# Patient Record
Sex: FEMALE | Race: ASIAN | NOT HISPANIC OR LATINO | Employment: PART TIME | URBAN - METROPOLITAN AREA
[De-identification: names, ages, dates, MRNs, and addresses within clinical notes are randomized per-mention and may not be internally consistent; named-entity substitution may affect disease eponyms.]

---

## 2017-09-12 ENCOUNTER — CONVERSION ENCOUNTER (OUTPATIENT)
Dept: MAMMOGRAPHY | Facility: HOSPITAL | Age: 47
End: 2017-09-12

## 2018-03-01 ENCOUNTER — OFFICE VISIT CONVERTED (OUTPATIENT)
Dept: GASTROENTEROLOGY | Facility: CLINIC | Age: 48
End: 2018-03-01
Attending: PHYSICIAN ASSISTANT

## 2018-04-27 ENCOUNTER — OFFICE VISIT CONVERTED (OUTPATIENT)
Dept: GASTROENTEROLOGY | Facility: CLINIC | Age: 48
End: 2018-04-27
Attending: PHYSICIAN ASSISTANT

## 2018-05-01 ENCOUNTER — CONVERSION ENCOUNTER (OUTPATIENT)
Dept: FAMILY MEDICINE CLINIC | Facility: CLINIC | Age: 48
End: 2018-05-01

## 2018-05-01 ENCOUNTER — OFFICE VISIT CONVERTED (OUTPATIENT)
Dept: FAMILY MEDICINE CLINIC | Facility: CLINIC | Age: 48
End: 2018-05-01
Attending: FAMILY MEDICINE

## 2018-06-05 ENCOUNTER — OFFICE VISIT CONVERTED (OUTPATIENT)
Dept: FAMILY MEDICINE CLINIC | Facility: CLINIC | Age: 48
End: 2018-06-05
Attending: FAMILY MEDICINE

## 2018-09-14 ENCOUNTER — OFFICE VISIT CONVERTED (OUTPATIENT)
Dept: FAMILY MEDICINE CLINIC | Facility: CLINIC | Age: 48
End: 2018-09-14
Attending: FAMILY MEDICINE

## 2018-10-29 ENCOUNTER — OFFICE VISIT CONVERTED (OUTPATIENT)
Dept: ORTHOPEDIC SURGERY | Facility: CLINIC | Age: 48
End: 2018-10-29
Attending: ORTHOPAEDIC SURGERY

## 2018-11-07 ENCOUNTER — OFFICE VISIT CONVERTED (OUTPATIENT)
Dept: ORTHOPEDIC SURGERY | Facility: CLINIC | Age: 48
End: 2018-11-07
Attending: ORTHOPAEDIC SURGERY

## 2018-11-07 ENCOUNTER — CONVERSION ENCOUNTER (OUTPATIENT)
Dept: ORTHOPEDIC SURGERY | Facility: CLINIC | Age: 48
End: 2018-11-07

## 2018-11-09 ENCOUNTER — CONVERSION ENCOUNTER (OUTPATIENT)
Dept: MAMMOGRAPHY | Facility: HOSPITAL | Age: 48
End: 2018-11-09

## 2019-04-02 ENCOUNTER — HOSPITAL ENCOUNTER (OUTPATIENT)
Dept: URGENT CARE | Facility: CLINIC | Age: 49
Discharge: HOME | End: 2019-04-02
Attending: PHYSICIAN ASSISTANT

## 2019-04-10 ENCOUNTER — HOSPITAL ENCOUNTER (OUTPATIENT)
Dept: URGENT CARE | Facility: CLINIC | Age: 49
Discharge: HOME | End: 2019-04-10
Attending: EMERGENCY MEDICINE

## 2019-04-15 ENCOUNTER — HOSPITAL ENCOUNTER (OUTPATIENT)
Dept: OTHER | Facility: HOSPITAL | Age: 49
Discharge: HOME | End: 2019-04-15
Attending: FAMILY MEDICINE

## 2019-04-15 LAB
25(OH)D3 SERPL-MCNC: 30.6 NG/ML (ref 30–100)
ALBUMIN SERPL-MCNC: 4.4 G/DL (ref 3.5–5)
ALBUMIN/GLOB SERPL: 1.5 {RATIO} (ref 1.4–2.6)
ALP SERPL-CCNC: 63 U/L (ref 42–98)
ALT SERPL-CCNC: 19 U/L (ref 10–40)
ANION GAP SERPL CALC-SCNC: 15 MMOL/L (ref 8–19)
AST SERPL-CCNC: 17 U/L (ref 15–50)
BASOPHILS # BLD AUTO: 0.02 10*3/UL (ref 0–0.2)
BASOPHILS NFR BLD AUTO: 0.5 % (ref 0–3)
BILIRUB SERPL-MCNC: 0.46 MG/DL (ref 0.2–1.3)
BUN SERPL-MCNC: 17 MG/DL (ref 5–25)
BUN/CREAT SERPL: 25 {RATIO} (ref 6–20)
CALCIUM SERPL-MCNC: 9.4 MG/DL (ref 8.7–10.4)
CHLORIDE SERPL-SCNC: 99 MMOL/L (ref 99–111)
CHOLEST SERPL-MCNC: 146 MG/DL (ref 107–200)
CHOLEST/HDLC SERPL: 3.7 {RATIO} (ref 3–6)
CONV ABS IMM GRAN: 0.02 10*3/UL (ref 0–0.2)
CONV CO2: 28 MMOL/L (ref 22–32)
CONV IMMATURE GRAN: 0.5 % (ref 0–1.8)
CONV TOTAL PROTEIN: 7.3 G/DL (ref 6.3–8.2)
CREAT UR-MCNC: 0.68 MG/DL (ref 0.5–0.9)
DEPRECATED RDW RBC AUTO: 39.1 FL (ref 36.4–46.3)
EOSINOPHIL # BLD AUTO: 0.11 10*3/UL (ref 0–0.7)
EOSINOPHIL # BLD AUTO: 2.5 % (ref 0–7)
ERYTHROCYTE [DISTWIDTH] IN BLOOD BY AUTOMATED COUNT: 11.4 % (ref 11.7–14.4)
FOLATE SERPL-MCNC: 10 NG/ML (ref 4.8–20)
GFR SERPLBLD BASED ON 1.73 SQ M-ARVRAT: >60 ML/MIN/{1.73_M2}
GLOBULIN UR ELPH-MCNC: 2.9 G/DL (ref 2–3.5)
GLUCOSE SERPL-MCNC: 84 MG/DL (ref 65–99)
HBA1C MFR BLD: 13.8 G/DL (ref 12–16)
HCT VFR BLD AUTO: 40.7 % (ref 37–47)
HDLC SERPL-MCNC: 39 MG/DL (ref 40–60)
IRON SATN MFR SERPL: 44 % (ref 20–55)
IRON SERPL-MCNC: 133 UG/DL (ref 60–170)
LDLC SERPL CALC-MCNC: 89 MG/DL (ref 70–100)
LYMPHOCYTES # BLD AUTO: 1.45 10*3/UL (ref 1–5)
MCH RBC QN AUTO: 31.7 PG (ref 27–31)
MCHC RBC AUTO-ENTMCNC: 33.9 G/DL (ref 33–37)
MCV RBC AUTO: 93.6 FL (ref 81–99)
MONOCYTES # BLD AUTO: 0.25 10*3/UL (ref 0.2–1.2)
MONOCYTES NFR BLD AUTO: 5.8 % (ref 3–10)
MONONUCLEOSIS TEST, QUAL: NEGATIVE
NEUTROPHILS # BLD AUTO: 2.47 10*3/UL (ref 2–8)
NEUTROPHILS NFR BLD AUTO: 57.1 % (ref 30–85)
NRBC CBCN: 0 % (ref 0–0.7)
OSMOLALITY SERPL CALC.SUM OF ELEC: 287 MOSM/KG (ref 273–304)
PLATELET # BLD AUTO: 256 10*3/UL (ref 130–400)
PMV BLD AUTO: 10.1 FL (ref 9.4–12.3)
POTASSIUM SERPL-SCNC: 3.9 MMOL/L (ref 3.5–5.3)
RBC # BLD AUTO: 4.35 10*6/UL (ref 4.2–5.4)
SODIUM SERPL-SCNC: 138 MMOL/L (ref 135–147)
T4 FREE SERPL-MCNC: 1.4 NG/DL (ref 0.9–1.8)
TIBC SERPL-MCNC: 302 UG/DL (ref 245–450)
TRANSFERRIN SERPL-MCNC: 211 MG/DL (ref 250–380)
TRIGL SERPL-MCNC: 89 MG/DL (ref 40–150)
TSH SERPL-ACNC: 1.08 M[IU]/L (ref 0.27–4.2)
VARIANT LYMPHS NFR BLD MANUAL: 33.6 % (ref 20–45)
VIT B12 SERPL-MCNC: 456 PG/ML (ref 211–911)
VLDLC SERPL-MCNC: 18 MG/DL (ref 5–37)
WBC # BLD AUTO: 4.32 10*3/UL (ref 4.8–10.8)

## 2019-04-16 ENCOUNTER — OFFICE VISIT CONVERTED (OUTPATIENT)
Dept: FAMILY MEDICINE CLINIC | Facility: CLINIC | Age: 49
End: 2019-04-16
Attending: FAMILY MEDICINE

## 2019-06-17 ENCOUNTER — OFFICE VISIT CONVERTED (OUTPATIENT)
Dept: PODIATRY | Facility: CLINIC | Age: 49
End: 2019-06-17
Attending: PODIATRIST

## 2019-07-22 ENCOUNTER — OFFICE VISIT CONVERTED (OUTPATIENT)
Dept: PODIATRY | Facility: CLINIC | Age: 49
End: 2019-07-22
Attending: PODIATRIST

## 2019-07-22 ENCOUNTER — CONVERSION ENCOUNTER (OUTPATIENT)
Dept: PODIATRY | Facility: CLINIC | Age: 49
End: 2019-07-22

## 2019-10-15 ENCOUNTER — HOSPITAL ENCOUNTER (OUTPATIENT)
Dept: OTHER | Facility: HOSPITAL | Age: 49
Discharge: HOME | End: 2019-10-15
Attending: FAMILY MEDICINE

## 2019-10-15 LAB
25(OH)D3 SERPL-MCNC: 29 NG/ML (ref 30–100)
ALBUMIN SERPL-MCNC: 4.5 G/DL (ref 3.5–5)
ALBUMIN/GLOB SERPL: 1.7 {RATIO} (ref 1.4–2.6)
ALP SERPL-CCNC: 71 U/L (ref 53–141)
ALT SERPL-CCNC: 14 U/L (ref 10–40)
ANION GAP SERPL CALC-SCNC: 18 MMOL/L (ref 8–19)
AST SERPL-CCNC: 18 U/L (ref 15–50)
BASOPHILS # BLD AUTO: 0.02 10*3/UL (ref 0–0.2)
BASOPHILS NFR BLD AUTO: 0.4 % (ref 0–3)
BILIRUB SERPL-MCNC: 0.41 MG/DL (ref 0.2–1.3)
BUN SERPL-MCNC: 24 MG/DL (ref 5–25)
BUN/CREAT SERPL: 26 {RATIO} (ref 6–20)
CALCIUM SERPL-MCNC: 9.4 MG/DL (ref 8.7–10.4)
CHLORIDE SERPL-SCNC: 102 MMOL/L (ref 99–111)
CHOLEST SERPL-MCNC: 172 MG/DL (ref 107–200)
CHOLEST/HDLC SERPL: 3.6 {RATIO} (ref 3–6)
CONV ABS IMM GRAN: 0.01 10*3/UL (ref 0–0.2)
CONV CO2: 25 MMOL/L (ref 22–32)
CONV IMMATURE GRAN: 0.2 % (ref 0–1.8)
CONV TOTAL PROTEIN: 7.1 G/DL (ref 6.3–8.2)
CREAT UR-MCNC: 0.93 MG/DL (ref 0.5–0.9)
DEPRECATED RDW RBC AUTO: 41.4 FL (ref 36.4–46.3)
EOSINOPHIL # BLD AUTO: 0.17 10*3/UL (ref 0–0.7)
EOSINOPHIL # BLD AUTO: 3.5 % (ref 0–7)
ERYTHROCYTE [DISTWIDTH] IN BLOOD BY AUTOMATED COUNT: 11.9 % (ref 11.7–14.4)
GFR SERPLBLD BASED ON 1.73 SQ M-ARVRAT: >60 ML/MIN/{1.73_M2}
GLOBULIN UR ELPH-MCNC: 2.6 G/DL (ref 2–3.5)
GLUCOSE SERPL-MCNC: 104 MG/DL (ref 65–99)
HCT VFR BLD AUTO: 42.1 % (ref 37–47)
HDLC SERPL-MCNC: 48 MG/DL (ref 40–60)
HGB BLD-MCNC: 14.2 G/DL (ref 12–16)
IRON SATN MFR SERPL: 20 % (ref 20–55)
IRON SERPL-MCNC: 62 UG/DL (ref 60–170)
LDLC SERPL CALC-MCNC: 111 MG/DL (ref 70–100)
LYMPHOCYTES # BLD AUTO: 1.43 10*3/UL (ref 1–5)
LYMPHOCYTES NFR BLD AUTO: 29.5 % (ref 20–45)
MCH RBC QN AUTO: 31.6 PG (ref 27–31)
MCHC RBC AUTO-ENTMCNC: 33.7 G/DL (ref 33–37)
MCV RBC AUTO: 93.8 FL (ref 81–99)
MONOCYTES # BLD AUTO: 0.43 10*3/UL (ref 0.2–1.2)
MONOCYTES NFR BLD AUTO: 8.9 % (ref 3–10)
NEUTROPHILS # BLD AUTO: 2.79 10*3/UL (ref 2–8)
NEUTROPHILS NFR BLD AUTO: 57.5 % (ref 30–85)
NRBC CBCN: 0 % (ref 0–0.7)
OSMOLALITY SERPL CALC.SUM OF ELEC: 296 MOSM/KG (ref 273–304)
PLATELET # BLD AUTO: 192 10*3/UL (ref 130–400)
PMV BLD AUTO: 10.1 FL (ref 9.4–12.3)
POTASSIUM SERPL-SCNC: 3.8 MMOL/L (ref 3.5–5.3)
RBC # BLD AUTO: 4.49 10*6/UL (ref 4.2–5.4)
SODIUM SERPL-SCNC: 141 MMOL/L (ref 135–147)
T4 FREE SERPL-MCNC: 1.2 NG/DL (ref 0.9–1.8)
TIBC SERPL-MCNC: 305 UG/DL (ref 245–450)
TRANSFERRIN SERPL-MCNC: 213 MG/DL (ref 250–380)
TRIGL SERPL-MCNC: 67 MG/DL (ref 40–150)
TSH SERPL-ACNC: 1.97 M[IU]/L (ref 0.27–4.2)
VLDLC SERPL-MCNC: 13 MG/DL (ref 5–37)
WBC # BLD AUTO: 4.85 10*3/UL (ref 4.8–10.8)

## 2019-10-17 ENCOUNTER — CONVERSION ENCOUNTER (OUTPATIENT)
Dept: FAMILY MEDICINE CLINIC | Facility: CLINIC | Age: 49
End: 2019-10-17

## 2019-10-17 ENCOUNTER — OFFICE VISIT CONVERTED (OUTPATIENT)
Dept: FAMILY MEDICINE CLINIC | Facility: CLINIC | Age: 49
End: 2019-10-17
Attending: FAMILY MEDICINE

## 2019-11-12 ENCOUNTER — HOSPITAL ENCOUNTER (OUTPATIENT)
Dept: MAMMOGRAPHY | Facility: HOSPITAL | Age: 49
Discharge: HOME | End: 2019-11-12
Attending: FAMILY MEDICINE

## 2020-01-24 ENCOUNTER — HOSPITAL ENCOUNTER (OUTPATIENT)
Dept: URGENT CARE | Facility: CLINIC | Age: 50
Discharge: HOME | End: 2020-01-24
Attending: NURSE PRACTITIONER

## 2020-01-26 LAB — BACTERIA SPEC AEROBE CULT: NORMAL

## 2020-02-25 ENCOUNTER — HOSPITAL ENCOUNTER (OUTPATIENT)
Dept: URGENT CARE | Facility: CLINIC | Age: 50
Discharge: HOME | End: 2020-02-25
Attending: EMERGENCY MEDICINE

## 2020-02-27 LAB — BACTERIA SPEC AEROBE CULT: NORMAL

## 2020-04-21 ENCOUNTER — TELEPHONE CONVERTED (OUTPATIENT)
Dept: FAMILY MEDICINE CLINIC | Facility: CLINIC | Age: 50
End: 2020-04-21
Attending: FAMILY MEDICINE

## 2020-07-15 ENCOUNTER — HOSPITAL ENCOUNTER (OUTPATIENT)
Dept: OTHER | Facility: HOSPITAL | Age: 50
Discharge: HOME | End: 2020-07-15
Attending: FAMILY MEDICINE

## 2020-07-15 LAB
25(OH)D3 SERPL-MCNC: 32.5 NG/ML (ref 30–100)
ALBUMIN SERPL-MCNC: 4.5 G/DL (ref 3.5–5)
ALBUMIN/GLOB SERPL: 1.7 {RATIO} (ref 1.4–2.6)
ALP SERPL-CCNC: 78 U/L (ref 53–141)
ALT SERPL-CCNC: 19 U/L (ref 10–40)
ANION GAP SERPL CALC-SCNC: 14 MMOL/L (ref 8–19)
AST SERPL-CCNC: 26 U/L (ref 15–50)
BASOPHILS # BLD AUTO: 0.03 10*3/UL (ref 0–0.2)
BASOPHILS NFR BLD AUTO: 0.6 % (ref 0–3)
BILIRUB SERPL-MCNC: 0.62 MG/DL (ref 0.2–1.3)
BUN SERPL-MCNC: 13 MG/DL (ref 5–25)
BUN/CREAT SERPL: 16 {RATIO} (ref 6–20)
CALCIUM SERPL-MCNC: 9.6 MG/DL (ref 8.7–10.4)
CHLORIDE SERPL-SCNC: 104 MMOL/L (ref 99–111)
CHOLEST SERPL-MCNC: 172 MG/DL (ref 107–200)
CHOLEST/HDLC SERPL: 3.4 {RATIO} (ref 3–6)
CONV ABS IMM GRAN: 0 10*3/UL (ref 0–0.2)
CONV CO2: 25 MMOL/L (ref 22–32)
CONV IMMATURE GRAN: 0 % (ref 0–1.8)
CONV TOTAL PROTEIN: 7.1 G/DL (ref 6.3–8.2)
CREAT UR-MCNC: 0.8 MG/DL (ref 0.5–0.9)
DEPRECATED RDW RBC AUTO: 42.1 FL (ref 36.4–46.3)
EOSINOPHIL # BLD AUTO: 0.09 10*3/UL (ref 0–0.7)
EOSINOPHIL # BLD AUTO: 1.9 % (ref 0–7)
ERYTHROCYTE [DISTWIDTH] IN BLOOD BY AUTOMATED COUNT: 12 % (ref 11.7–14.4)
FOLATE SERPL-MCNC: 16.7 NG/ML (ref 4.8–20)
GFR SERPLBLD BASED ON 1.73 SQ M-ARVRAT: >60 ML/MIN/{1.73_M2}
GLOBULIN UR ELPH-MCNC: 2.6 G/DL (ref 2–3.5)
GLUCOSE SERPL-MCNC: 96 MG/DL (ref 65–99)
HCT VFR BLD AUTO: 43.3 % (ref 37–47)
HDLC SERPL-MCNC: 50 MG/DL (ref 40–60)
HGB BLD-MCNC: 14.3 G/DL (ref 12–16)
IRON SATN MFR SERPL: 34 % (ref 20–55)
IRON SERPL-MCNC: 110 UG/DL (ref 60–170)
LDLC SERPL CALC-MCNC: 100 MG/DL (ref 70–100)
LYMPHOCYTES # BLD AUTO: 1.51 10*3/UL (ref 1–5)
LYMPHOCYTES NFR BLD AUTO: 32 % (ref 20–45)
MCH RBC QN AUTO: 31.4 PG (ref 27–31)
MCHC RBC AUTO-ENTMCNC: 33 G/DL (ref 33–37)
MCV RBC AUTO: 95.2 FL (ref 81–99)
MONOCYTES # BLD AUTO: 0.41 10*3/UL (ref 0.2–1.2)
MONOCYTES NFR BLD AUTO: 8.7 % (ref 3–10)
NEUTROPHILS # BLD AUTO: 2.68 10*3/UL (ref 2–8)
NEUTROPHILS NFR BLD AUTO: 56.8 % (ref 30–85)
NRBC CBCN: 0 % (ref 0–0.7)
OSMOLALITY SERPL CALC.SUM OF ELEC: 288 MOSM/KG (ref 273–304)
PLATELET # BLD AUTO: 236 10*3/UL (ref 130–400)
PMV BLD AUTO: 10.8 FL (ref 9.4–12.3)
POTASSIUM SERPL-SCNC: 4.3 MMOL/L (ref 3.5–5.3)
RBC # BLD AUTO: 4.55 10*6/UL (ref 4.2–5.4)
SODIUM SERPL-SCNC: 139 MMOL/L (ref 135–147)
T4 FREE SERPL-MCNC: 1.3 NG/DL (ref 0.9–1.8)
TIBC SERPL-MCNC: 325 UG/DL (ref 245–450)
TRANSFERRIN SERPL-MCNC: 227 MG/DL (ref 250–380)
TRIGL SERPL-MCNC: 108 MG/DL (ref 40–150)
TSH SERPL-ACNC: 0.96 M[IU]/L (ref 0.27–4.2)
VIT B12 SERPL-MCNC: 383 PG/ML (ref 211–911)
VLDLC SERPL-MCNC: 22 MG/DL (ref 5–37)
WBC # BLD AUTO: 4.72 10*3/UL (ref 4.8–10.8)

## 2020-07-23 ENCOUNTER — OFFICE VISIT CONVERTED (OUTPATIENT)
Dept: FAMILY MEDICINE CLINIC | Facility: CLINIC | Age: 50
End: 2020-07-23
Attending: FAMILY MEDICINE

## 2020-08-18 ENCOUNTER — HOSPITAL ENCOUNTER (OUTPATIENT)
Dept: PERIOP | Facility: HOSPITAL | Age: 50
Setting detail: HOSPITAL OUTPATIENT SURGERY
Discharge: HOME | End: 2020-08-18
Attending: INTERNAL MEDICINE

## 2020-08-18 LAB — SARS-COV-2 RNA SPEC QL NAA+PROBE: NOT DETECTED

## 2020-10-19 ENCOUNTER — OFFICE VISIT CONVERTED (OUTPATIENT)
Dept: ORTHOPEDIC SURGERY | Facility: CLINIC | Age: 50
End: 2020-10-19
Attending: ORTHOPAEDIC SURGERY

## 2020-11-18 ENCOUNTER — HOSPITAL ENCOUNTER (OUTPATIENT)
Dept: URGENT CARE | Facility: CLINIC | Age: 50
Discharge: HOME | End: 2020-11-18
Attending: NURSE PRACTITIONER

## 2020-11-21 LAB — SARS-COV-2 RNA SPEC QL NAA+PROBE: NOT DETECTED

## 2021-03-08 ENCOUNTER — HOSPITAL ENCOUNTER (OUTPATIENT)
Dept: MAMMOGRAPHY | Facility: HOSPITAL | Age: 51
Discharge: HOME | End: 2021-03-08
Attending: FAMILY MEDICINE

## 2021-03-30 ENCOUNTER — HOSPITAL ENCOUNTER (OUTPATIENT)
Dept: OTHER | Facility: HOSPITAL | Age: 51
Discharge: HOME | End: 2021-03-30
Attending: FAMILY MEDICINE

## 2021-03-30 LAB
25(OH)D3 SERPL-MCNC: 29.3 NG/ML (ref 30–100)
ALBUMIN SERPL-MCNC: 4.3 G/DL (ref 3.5–5)
ALBUMIN/GLOB SERPL: 1.5 {RATIO} (ref 1.4–2.6)
ALP SERPL-CCNC: 69 U/L (ref 53–141)
ALT SERPL-CCNC: 13 U/L (ref 10–40)
ANION GAP SERPL CALC-SCNC: 11 MMOL/L (ref 8–19)
AST SERPL-CCNC: 17 U/L (ref 15–50)
BASOPHILS # BLD AUTO: 0.02 10*3/UL (ref 0–0.2)
BASOPHILS NFR BLD AUTO: 0.4 % (ref 0–3)
BILIRUB SERPL-MCNC: 0.32 MG/DL (ref 0.2–1.3)
BUN SERPL-MCNC: 24 MG/DL (ref 5–25)
BUN/CREAT SERPL: 29 {RATIO} (ref 6–20)
CALCIUM SERPL-MCNC: 9.5 MG/DL (ref 8.7–10.4)
CHLORIDE SERPL-SCNC: 105 MMOL/L (ref 99–111)
CHOLEST SERPL-MCNC: 170 MG/DL (ref 107–200)
CHOLEST/HDLC SERPL: 3.5 {RATIO} (ref 3–6)
CONV ABS IMM GRAN: 0.01 10*3/UL (ref 0–0.2)
CONV CO2: 27 MMOL/L (ref 22–32)
CONV IMMATURE GRAN: 0.2 % (ref 0–1.8)
CONV TOTAL PROTEIN: 7.2 G/DL (ref 6.3–8.2)
CREAT UR-MCNC: 0.83 MG/DL (ref 0.5–0.9)
DEPRECATED RDW RBC AUTO: 40.8 FL (ref 36.4–46.3)
EOSINOPHIL # BLD AUTO: 0.07 10*3/UL (ref 0–0.7)
EOSINOPHIL # BLD AUTO: 1.5 % (ref 0–7)
ERYTHROCYTE [DISTWIDTH] IN BLOOD BY AUTOMATED COUNT: 11.8 % (ref 11.7–14.4)
FOLATE SERPL-MCNC: 11.3 NG/ML (ref 4.8–20)
GFR SERPLBLD BASED ON 1.73 SQ M-ARVRAT: >60 ML/MIN/{1.73_M2}
GLOBULIN UR ELPH-MCNC: 2.9 G/DL (ref 2–3.5)
GLUCOSE SERPL-MCNC: 106 MG/DL (ref 65–99)
HCT VFR BLD AUTO: 41.5 % (ref 37–47)
HDLC SERPL-MCNC: 49 MG/DL (ref 40–60)
HGB BLD-MCNC: 14 G/DL (ref 12–16)
IRON SATN MFR SERPL: 20 % (ref 20–55)
IRON SERPL-MCNC: 66 UG/DL (ref 60–170)
LDLC SERPL CALC-MCNC: 108 MG/DL (ref 70–100)
LYMPHOCYTES # BLD AUTO: 1.22 10*3/UL (ref 1–5)
LYMPHOCYTES NFR BLD AUTO: 25.3 % (ref 20–45)
MCH RBC QN AUTO: 32 PG (ref 27–31)
MCHC RBC AUTO-ENTMCNC: 33.7 G/DL (ref 33–37)
MCV RBC AUTO: 94.7 FL (ref 81–99)
MONOCYTES # BLD AUTO: 0.37 10*3/UL (ref 0.2–1.2)
MONOCYTES NFR BLD AUTO: 7.7 % (ref 3–10)
NEUTROPHILS # BLD AUTO: 3.13 10*3/UL (ref 2–8)
NEUTROPHILS NFR BLD AUTO: 64.9 % (ref 30–85)
NRBC CBCN: 0 % (ref 0–0.7)
OSMOLALITY SERPL CALC.SUM OF ELEC: 292 MOSM/KG (ref 273–304)
PLATELET # BLD AUTO: 217 10*3/UL (ref 130–400)
PMV BLD AUTO: 10.7 FL (ref 9.4–12.3)
POTASSIUM SERPL-SCNC: 4.2 MMOL/L (ref 3.5–5.3)
RBC # BLD AUTO: 4.38 10*6/UL (ref 4.2–5.4)
SODIUM SERPL-SCNC: 139 MMOL/L (ref 135–147)
T4 FREE SERPL-MCNC: 1.2 NG/DL (ref 0.9–1.8)
TIBC SERPL-MCNC: 337 UG/DL (ref 245–450)
TRANSFERRIN SERPL-MCNC: 236 MG/DL (ref 250–380)
TRIGL SERPL-MCNC: 67 MG/DL (ref 40–150)
TSH SERPL-ACNC: 1.72 M[IU]/L (ref 0.27–4.2)
VIT B12 SERPL-MCNC: 490 PG/ML (ref 211–911)
VLDLC SERPL-MCNC: 13 MG/DL (ref 5–37)
WBC # BLD AUTO: 4.82 10*3/UL (ref 4.8–10.8)

## 2021-04-05 ENCOUNTER — CONVERSION ENCOUNTER (OUTPATIENT)
Dept: FAMILY MEDICINE CLINIC | Facility: CLINIC | Age: 51
End: 2021-04-05

## 2021-04-05 ENCOUNTER — OFFICE VISIT CONVERTED (OUTPATIENT)
Dept: FAMILY MEDICINE CLINIC | Facility: CLINIC | Age: 51
End: 2021-04-05
Attending: FAMILY MEDICINE

## 2021-05-10 NOTE — H&P
History and Physical      Patient Name: Kalpana Marquez   Patient ID: [id number]   Sex: Female   YOB: 1970    Primary Care Provider: Go Lucio MD   Referring Provider: Go Lucio MD    Visit Date: October 19, 2020    Provider: Hubert Weber MD   Location: [facility]   Location Address: [address] [place] [address]  [id number]   Location Phone: [phone]          Chief Complaint  · Bilateral Knee Pain      History Of Present Illness  Kalpana Marquez is a 50 year old /White female who presents today to [facility].      Patient presents today with a chief complaint of bilateral knee pain. Patient states she has a stationary bicycle for fitness and states she was only on it for 2-3 minutes. Patient states that the following day bilateral knees where in a lot of pain. Patient states bilateral knee pain started about a month ago. Patient states before that her knees have been doing fine. Patient states her right knee is worse than her left. Patient states she can walk 5-6 miles and her knees don't bother her. Patient states that injections she has received in the past have helped her significantly.       Past Medical History  Allergic rhinitis; Arthritis; Depression; Foot pain; Heel pain; Hypertension; Numbness in feet; Seasonal allergies         Past Surgical History  Cesarian Section; Colonoscopy; EGD; Hysterectomy         Medication List  amlodipine 10 mg oral tablet; Claritin 10 mg oral tablet; cyanocobalamin (vitamin B-12) 1,000 mcg/mL injection solution; cyclobenzaprine 5 mg oral tablet; diclofenac sodium 75 mg oral tablet,delayed release (DR/EC); ergocalciferol (vitamin D2) 1,250 mcg (50,000 unit) oral capsule; ketotifen fumarate 0.025 % (0.035 %) ophthalmic (eye) drops; meclizine 12.5 mg oral tablet; meloxicam 7.5 mg oral tablet; mometasone 50 mcg/actuation nasal spray,non-aerosol; Singulair 10 mg oral tablet         Allergy List  NO KNOWN DRUG ALLERGIES; Latex  "Exam Gloves       Allergies Reconciled  Family Medical History  Stroke; Cancer, Unspecified; Diabetes, unspecified type; - No Family History of Colorectal Cancer; Family history of certain chronic disabling diseases; arthritis         Reproductive History   0 Para 0 0 0 0       Social History  Alcohol (Never); Alcohol Use (Never); .; lives with children; Recreational Drug Use (Current some day); Single.; Tobacco (Former); Working         Immunizations  Name Date Admin   Hepatitis A 2019   Hepatitis A 2018   Influenza Refused   Meningococcal (MNG) 2014   Meningococcal (MNG) 1979         Review of Systems  · Constitutional  o Denies  o : fever, chills, weight loss  · Cardiovascular  o Denies  o : chest pain, shortness of breath  · Gastrointestinal  o Denies  o : liver disease, heartburn, nausea, blood in stools  · Genitourinary  o Denies  o : painful urination, blood in urine  · Integument  o Denies  o : rash, itching  · Neurologic  o Denies  o : headache, weakness, loss of consciousness  · Musculoskeletal  o Denies  o : painful, swollen joints  · Psychiatric  o Denies  o : drug/alcohol addiction, anxiety, depression      Vitals  Date Time BP Position Site L\R Cuff Size HR RR TEMP (F) WT  HT  BMI kg/m2 BSA m2 O2 Sat FR L/min FiO2 HC       10/19/2020 09:31 AM      72 - R   173lbs 0oz 5'  1\" 32.69 1.84 98 %            Physical Examination  · Constitutional  o Appearance  o : well developed, well-nourished, no obvious deformities present  · Head and Face  o Head  o :   § Inspection  § : normocephalic  o Face  o :   § Inspection  § : no facial lesions  · Eyes  o Conjunctivae  o : conjunctivae normal  o Sclerae  o : sclerae white  · Ears, Nose, Mouth and Throat  o Ears  o :   § External Ears  § : appearance within normal limits  § Hearing  § : intact  o Nose  o :   § External Nose  § : appearance normal  · Neck  o Inspection/Palpation  o : normal appearance  o Range of Motion  o : " full range of motion  · Respiratory  o Respiratory Effort  o : breathing unlabored  o Inspection of Chest  o : normal appearance  o Auscultation of Lungs  o : no audible wheezing or rales  · Cardiovascular  o Heart  o : regular rate  · Gastrointestinal  o Abdominal Examination  o : soft and non-tender  · Skin and Subcutaneous Tissue  o General Inspection  o : intact, no rashes  · Psychiatric  o General  o : Alert and oriented x3  o Judgement and Insight  o : judgment and insight intact  o Mood and Affect  o : mood normal, affect appropriate  · Extremities  o Extremities  o : BILATERAL KNEES: Sensation grossly intact. Neurovascular intact. Pulses normal. Tender VMO tendon. No swelling or atrophy. Stable gait. Full weight-bearing. Stable to valgus/varus stress. Good strength in quadriceps, hamstrings, dorsiflexors, and plantar flexors. Full flexion and extension. Calf supple, non-tender. Negative Lachman. Negative anterior drawer. Negative posterior sag. No pain with Apley's. No pain with Shauna's.   · In Office Procedures  o View  o : LAT/SUNRISE/STANDING   o Site  o : bilateral, knee  o Indication  o : Bilateral knee pain  o Study  o : X-rays ordered, taken in the office, and reviewed today.  o Xray  o : Good space between bones of bilateral knees. Negative signs of bone spurs. Negative signs of fracture or dislocation.           Assessment  · Pain in both knees, unspecified chronicity       Pain in right knee     719.46/M25.561  Pain in left knee     719.46/M25.562      Plan  · Orders  o Knee (Left) [facility] Preferred View ([id number]) - 719.46/M25.562 - 10/19/2020  o Knee (Right) [facility] Preferred View ([id number]) - 719.46/M25.561 - 10/19/2020  · Medications  o Medications have been Reconciled  o Transition of Care or Provider Policy  · Instructions  o Dr. Weber saw and examined the patient and agrees with plan.   o X-rays reviewed by Dr. Weber.  o Reviewed the patient's Past Medical, Social, and Family history as well as the  ROS at today's visit, no changes.  o Call or return if worsening symptoms.  o Follow Up PRN.  o This note was transcribed by Kezia Ward. melissa  o Discussed diagnosis and treatment options with the patient. Discussed seeing a podiatrist for further evaluation of foot. Patient will follow up if she wants to receive injection in her bilateral knees.             Electronically Signed by: Kezia Ward-, Other -Author on October 19, 2020 01:34:53 PM  Electronically Co-signed by: Hubert Weber MD -Reviewer on October 21, 2020 04:51:32 PM

## 2021-05-12 NOTE — PROGRESS NOTES
Quick Note      Patient Name: Kalpana Marquez   Patient ID: [id number]   Sex: Female   YOB: 1970    Primary Care Provider: Go Lucio MD   Referring Provider: Go Lucio MD    Visit Date: April 21, 2020    Provider: Go Lucio MD   Location: [facility]   Location Address: [address] [place] [address], [address] [place] [address]  [id number]   Location Phone: [phone]          History Of Present Illness  TELEHEALTH TELEPHONE VISIT  Chief Complaint: f/u, refills   Kalpana Marquez is a 49 year old /White female who is presenting for evaluation via telehealth telephone visit. Verbal consent obtained before beginning visit.   Provider spent 12 min minutes with the patient during telehealth visit.   The following staff were present during this visit: Dr. Lucio, telehealth via telephone conversation   Past Medical History/Overview of Patient Symptoms     Pt for f/u.  hx of vertigo..needs a refill on meclizine.    Hx of HTN. needs a refill on amlodipine.       Vitals     telehealth       Physical Examination     telehealth           Assessment  · Vertigo     780.4/R42  · HTN (hypertension)     401.9/I10  · Elbow pain, right     719.42/M25.521       f/ u in 3 months for CPX.      of note.she also c/o right elbow pain..will do an xray of right elbow.       Plan  · Orders  o Physician Telephone Evaluation, 11-20 minutes (89197) - - 04/21/2020  o ACO-14: Influenza immunization administered or previously received () - - 04/21/2020  o ACO-39: Current medications updated and reviewed () - - 04/21/2020  o Elbow (Right) 3 views X-Ray [facility] Preferred View ([id number]) - 719.42/M25.521 - 04/21/2020  · Medications  o Medications have been Reconciled  o Transition of Care or Provider Policy  · Instructions  o Plan Of Care:   o Electronically Identified Patient Education Materials Provided Electronically  · Disposition  o Call or Return if symptoms worsen or persist.  o Care  Transition            Electronically Signed by: Go Lucio MD -Author on April 22, 2020 07:42:42 AM

## 2021-05-13 NOTE — PROGRESS NOTES
Progress Note      Patient Name: Kalpana Marquez   Patient ID: [id number]   Sex: Female   YOB: 1970    Primary Care Provider: Go Lucio MD   Referring Provider: Go Lucio MD    Visit Date: July 23, 2020    Provider: Go Lucio MD   Location: [facility]   Location Address: [address] [place] [address], [address]  [id number]   Location Phone: [phone]          Chief Complaint     Follow up three months       History Of Present Illness  Kalpana Marquez is a 50 year old /White female who presents for evaluation and treatment of:      PT presents for f/u and lab review.    Hx of low B12. Still mildly low. She is on B12 injections once a month.    Hx of allergic rhinits controlled.     Hx of HTN. COntrolled.    she c/o swelling of the right foot...intermittently. she is on amlodpine 10 mg.     hx of narcolepsy...she doesnt want to take medication for it.      hx of arthritis..controlled.           Past Medical History  Disease Name Date Onset Notes   Allergic rhinitis --  --    Arthritis --  --    Depression --  --    Foot pain --  --    Heel pain --  --    Hypertension --  --    Numbness in feet --  --    Seasonal allergies --  --          Past Surgical History  Procedure Name Date Notes   Cesarian Section --  x6.   Colonoscopy --  --    EGD 2018 --    Hysterectomy --  --          Medication List  Name Date Started Instructions   amlodipine 10 mg oral tablet 04/21/2020 TAKE 1 TABLET(10 MG) BY MOUTH EVERY DAY   Claritin 10 mg oral tablet  take 1 tablet (10 mg) by oral route once daily   cyanocobalamin (vitamin B-12) 1,000 mcg/mL injection solution  inject 1 milliliter (1,000 mcg) by intramuscular route once a month   cyclobenzaprine 5 mg oral tablet 10/17/2019 take 1 tablet po QHS   diclofenac sodium 75 mg oral tablet,delayed release (DR/EC) 07/22/2019 take 1 tablet (75 mg) by oral route 2 times per day for 30 days   ergocalciferol (vitamin D2) 1,250 mcg (50,000 unit) oral  capsule 2020 TAKE 1 CAPSULE BY MOUTH 1 TIME WEEKLY   ketotifen fumarate 0.025 % (0.035 %) ophthalmic (eye) drops  instill 1 drop into affected eye(s) by ophthalmic route 2 times per day   meclizine 12.5 mg oral tablet 2020 take 1 tablet by oral route 3 times a day as needed   meloxicam 7.5 mg oral tablet 10/17/2019 take 1 tablet (7.5 mg) by oral route once daily for 30 days   mometasone 50 mcg/actuation nasal spray,non-aerosol 2019 spray 2 sprays (100 mcg) in each nostril by intranasal route once daily for 30 days   Singulair 10 mg oral tablet 2019 TAKE 1 TABLET (10 MG) BY ORAL ROUTE ONCE DAILY IN THE EVENING FOR 30 DAYS         Allergy List  Allergen Name Date Reaction Notes   NO KNOWN DRUG ALLERGIES --  --  --    Latex Exam Gloves --  --  --          Family Medical History  Disease Name Relative/Age Notes   Stroke Father/   Father   Cancer, Unspecified Father/   Father   Diabetes, unspecified type Mother/  Sister/   Mother; Sister  Mother  Mother; Sister   - No Family History of Colorectal Cancer  --    Family history of certain chronic disabling diseases; arthritis Mother/   Mother         Reproductive History  Menstrual   Last Menstrual Period: 2014   Pregnancy Summary   Total Pregnancies: 0 Full Term: 0 Premature: 0   Ab Induced: 0 Ab Spontaneous: 0 Ectopics: 0   Multiples: 0 Livin         Social History  Finding Status Start/Stop Quantity Notes   Alcohol Never --/-- --  --    Alcohol Use Never --/-- --  does not drink   . --  --/-- --  --    lives with children --  --/-- --  --    Recreational Drug Use Current some day --/-- --  yes   Single. --  --/-- --  --    Tobacco Former --/-- 0.5 PPD current some day smoker, 0.5 pack per day, smoked 4 years  current some day smoker  current some day smoker, smoked 5 years  [place]   Working --  --/-- --  --          Immunizations  NameDate Admin Mfg Trade Name Lot Number Route Inj VIS Given VIS Publication   Hepatitis  "A04/16/2019 SKB HAVRIX-ADULT [id number] IM LA 04/16/2019    Comments:    Hepatitis A06/05/2018 SKB HAVRIX-ADULT  IM LD 06/05/2018 07/20/2016   Comments:    InfluenzaRefused 10/17/2019 NE Not Entered  NE NE     Comments:    Meningococcal (MNG)08/22/2014 PMC MENACTRA [id number] IM RD 08/22/2014 01/01/1900   Comments:    Meningococcal (MNG)Unknown NE Not Entered  NE NE     Comments:          Review of Systems  · Constitutional  o Denies  o : fever  · Cardiovascular  o Denies  o : claudication, chest pressure, palpitations  · Respiratory  o Denies  o : shortness of breath, wheezing, cough, hemoptysis, dyspnea on exertion  · Gastrointestinal  o Denies  o : nausea, vomiting, diarrhea, constipation, abdominal pain      Vitals  Date Time BP Position Site L\R Cuff Size HR RR TEMP (F) WT  HT  BMI kg/m2 BSA m2 O2 Sat HC       07/23/2020 07:33 /81 Sitting    56 - R  97.2 170lbs 9oz 5'  1\" 32.23 1.82 98 %          Physical Examination  · Constitutional  o Appearance  o : alert, in no acute distress, well developed, well-nourished  · Head and Face  o Head  o : normocephalic, atraumatic, non tender, no palpable masses or nodules.  o Face  o : no facial lesions  · Eyes  o Vision  o : Acuity: grossly normal at distance, Conjuntivae: Normal, Sclerae white  · Respiratory  o Auscultation of Lungs  o : normal breath sounds throughout  · Cardiovascular  o Heart  o : Regular rate and rhythm, Normal S1,S2 , 1 plus non pitting edema  · Psychiatric  o Mood and Affect  o : normal mood and affect          Assessment  · Routine lab draw     V72.60/Z01.89  · HTN (hypertension)     401.9/I10  · B12 deficiency     266.2/E53.8  · Fatigue     780.79/R53.83  · Edema     782.3/R60.9  · Hypersomnia     780.54/G47.10       reduce amlodipine to 5 mg to help with swelling.      she wants to wait on hypersomnia meds.      increase b12 to every 2 weeks.       Plan  · Orders  o CBC with Auto Diff [facility] (17586) - V72.60/Z01.89, 401.9/I10, 266.2/E53.8, " 780.79/R53.83 - 01/23/2021  o CMP [facility] (91837) - V72.60/Z01.89, 401.9/I10, 266.2/E53.8, 780.79/R53.83 - 01/23/2021  o TSH and FT4 (17295, 44002, THYII) - V72.60/Z01.89, 401.9/I10, 266.2/E53.8, 780.79/R53.83 - 01/23/2021  o Lipid Panel [facility] (83912) - V72.60/Z01.89, 401.9/I10, 266.2/E53.8, 780.79/R53.83 - 01/23/2021  o Vitamin D (25-Hydroxy) Level (34340) - V72.60/Z01.89, 401.9/I10, 266.2/E53.8, 780.79/R53.83 - 01/23/2021  o Iron Profile (Iron, TIBC, and Transferrin) (IRONP) - V72.60/Z01.89, 401.9/I10, 266.2/E53.8, 780.79/R53.83 - 01/23/2021  o B12 Folate levels (B12FO) - V72.60/Z01.89, 401.9/I10, 266.2/E53.8, 780.79/R53.83 - 01/23/2021  o Vitamin B12 Injection () - 266.2/E53.8 - 07/23/2020   Injection - Vitamin B12; Dose: 1,000 mcg; Site: Left Deltoid; Route: intramuscular; Date: 07/23/2020 08:17:09; Exp: 03/31/2022; Lot: [id number]; Mfg: [employer]upy; TradeName: cyanocobalamin (vitamin B-12); Location: [facility]; Administered By: Fatemeh Casillas LPN; Comment:   o IM/SQ - Injection Fee [facility] (65378) - 266.2/E53.8 - 07/23/2020  o ACO-14: Influenza immunization was not administered for reasons documented () - - 07/23/2020  o ACO-39: Current medications updated and reviewed () - - 07/23/2020  · Medications  o Medications have been Reconciled  o Transition of Care or Provider Policy  · Instructions  o Electronically Identified Patient Education Materials Provided Electronically  · Disposition  o Call or Return if symptoms worsen or persist.  o Care Transition            Electronically Signed by: Go Lucio MD -Author on July 23, 2020 12:55:10 PM

## 2021-05-14 VITALS
OXYGEN SATURATION: 96 % | SYSTOLIC BLOOD PRESSURE: 132 MMHG | DIASTOLIC BLOOD PRESSURE: 80 MMHG | BODY MASS INDEX: 30.78 KG/M2 | RESPIRATION RATE: 18 BRPM | HEART RATE: 58 BPM | HEIGHT: 61 IN | TEMPERATURE: 96.9 F | WEIGHT: 163 LBS

## 2021-05-14 VITALS — HEIGHT: 61 IN | HEART RATE: 72 BPM | BODY MASS INDEX: 32.66 KG/M2 | WEIGHT: 173 LBS | OXYGEN SATURATION: 98 %

## 2021-05-14 NOTE — PROGRESS NOTES
Progress Note      Patient Name: Kalpana Marquez   Patient ID: [id number]   Sex: Female   YOB: 1970    Primary Care Provider: Go Lucio MD   Referring Provider: Go Lucio MD    Visit Date: April 5, 2021    Provider: Go Lucio MD   Location: [facility]   Location Address: [address] [place] [address], [address]  [id number]   Location Phone: [phone]          Chief Complaint  · Annual Exam  · (Health Maintainence Information Reviewed Under Results)      History Of Present Illness  Kalpana Marquez is a 50 year old /White female who presents for evaluation and treatment of:   Last PAP Smear: 2014.   Date of Last Mammogram: 2021.   Date of Last Colonoscopy: 2016       Past Medical History  Disease Name Date Onset Notes   Allergic rhinitis --  --    Arthritis --  --    Depression --  --    Foot pain --  --    Heel pain --  --    Hypertension --  --    Limb Swelling --  --    Numbness in feet --  --    Seasonal allergies --  --          Past Surgical History  Procedure Name Date Notes   Cesarian Section --  x6.   Colonoscopy --  --    EGD 2018, 2020 --    Hysterectomy --  --          Medication List  Name Date Started Instructions   amlodipine 10 mg oral tablet 04/05/2021 TAKE 1 TABLET(10 MG) BY MOUTH EVERY DAY   Claritin 10 mg oral tablet  take 1 tablet (10 mg) by oral route once daily   cyanocobalamin (vitamin B-12) 1,000 mcg/mL injection solution  inject 1 milliliter (1,000 mcg) by intramuscular route once a month   cyclobenzaprine 5 mg oral tablet 10/17/2019 take 1 tablet po QHS   diclofenac sodium 75 mg oral tablet,delayed release (DR/EC) 04/05/2021 take 1 tablet (75 mg) by oral route 2 times per day for 30 days   DICLOFENAC SODIUM 75MG DR TABLETS 03/15/2021 TAKE 1 TABLET(75 MG) BY MOUTH TWICE DAILY   ergocalciferol (vitamin D2) 1,250 mcg (50,000 unit) oral capsule 04/05/2021 TAKE 1 CAPSULE BY MOUTH 1 TIME WEEKLY   ketotifen fumarate 0.025 % (0.035 %)  ophthalmic (eye) drops  instill 1 drop into affected eye(s) by ophthalmic route 2 times per day   meclizine 12.5 mg oral tablet 2021 take 1 tablet by oral route 3 times a day as needed   meloxicam 7.5 mg oral tablet 10/17/2019 take 1 tablet (7.5 mg) by oral route once daily for 30 days   mometasone 50 mcg/actuation nasal spray,non-aerosol 2019 spray 2 sprays (100 mcg) in each nostril by intranasal route once daily for 30 days   prednisone 20 mg oral tablet 2021 take 1 tablet (20 mg) by oral route once daily for 5 days   Singulair 10 mg oral tablet 2019 TAKE 1 TABLET (10 MG) BY ORAL ROUTE ONCE DAILY IN THE EVENING FOR 30 DAYS         Allergy List  Allergen Name Date Reaction Notes   NO KNOWN DRUG ALLERGIES --  --  --    Latex Exam Gloves --  --  --        Allergies Reconciled  Family Medical History  Disease Name Relative/Age Notes   Stroke Father/   Father   Cancer, Unspecified Father/   Father   Diabetes, unspecified type Mother/   Mother  Mother; Sister  Mother  Mother; Sister   - No Family History of Colorectal Cancer  --    Family history of certain chronic disabling diseases; arthritis Mother/   Mother   Family history of Arthritis Mother/   Mother         Reproductive History  Menstrual   Last Menstrual Period: 2014   Pregnancy Summary   Total Pregnancies: 0 Full Term: 0 Premature: 0   Ab Induced: 0 Ab Spontaneous: 0 Ectopics: 0   Multiples: 0 Livin         Social History  Finding Status Start/Stop Quantity Notes   Alcohol Never --/-- --  --    Alcohol Use Never --/-- --  does not drink   . --  --/-- --  --    lives with children --  --/-- --  --    Recreational Drug Use Current some day --/-- --  yes   Single. --  --/-- --  --    Tobacco Current some day --/-- --  current some day smoker, smoked 6-10 years  current some day smoker, 0.5 pack per day, smoked 4 years  current some day smoker  current some day smoker, smoked 5 years  [place]   Working --  --/-- --   "--          Immunizations  NameDate Admin Mfg Trade Name Lot Number Route Inj VIS Given VIS Publication   Hepatitis A04/16/2019 [employer] HAVRIX-ADULT [id number] IM LA 04/16/2019    Comments:    Hepatitis A06/05/2018 [employer] HAVRIX-ADULT  IM LD 06/05/2018 07/20/2016   Comments:    InfluenzaRefused 04/05/2021 NE Not Entered  NE NE     Comments:    Meningococcal (MNG)08/22/2014 [facility] MENACTRA [id number] IM RD 08/22/2014    Comments:    Meningococcal (MNG)06/05/1979 NE Not Entered  NE NE     Comments:          Review of Systems  · Constitutional  o Denies  o : night sweats  · Eyes  o Denies  o : double vision, blurred vision  · HENT  o Denies  o : vertigo, recent head injury  · Breasts  o Denies  o : abnormal changes in breast size, additional breast symptoms except as noted in the HPI  · Cardiovascular  o Denies  o : chest pain, irregular heart beats  · Respiratory  o Denies  o : shortness of breath, productive cough  · Gastrointestinal  o Denies  o : nausea, vomiting  · Genitourinary  o Denies  o : dysuria, urinary retention  · Integument  o Denies  o : hair growth change, new skin lesions  · Neurologic  o Denies  o : altered mental status, seizures  · Musculoskeletal  o Denies  o : joint swelling, limitation of motion  · Endocrine  o Denies  o : cold intolerance, heat intolerance  · Heme-Lymph  o Denies  o : petechiae, lymph node enlargement or tenderness  · Allergic-Immunologic  o Denies  o : frequent illnesses      Vitals  Date Time BP Position Site L\R Cuff Size HR RR TEMP (F) WT  HT  BMI kg/m2 BSA m2 O2 Sat FR L/min FiO2 HC       04/05/2021 08:21 /80 Sitting    58 - R 18 96.9 163lbs 0oz 5'  1\" 30.8 1.78 96 %  21%          Physical Examination  · Constitutional  o Appearance  o : well-nourished, in no acute distress  · Neck  o Inspection/Palpation  o : normal appearance, no masses or tenderness, trachea midline  o Thyroid  o : gland size normal, nontender, no nodules or masses present on " palpation  · Respiratory  o Respiratory Effort  o : breathing unlabored  o Inspection of Chest  o : normal appearance  o Auscultation of Lungs  o : normal breath sounds throughout  · Cardiovascular  o Heart  o :   § Auscultation of Heart  § : regular rate and rhythm, no murmurs, gallops or rubs  · Psychiatric  o Judgement and Insight  o : judgment and insight intact  o Mood and Affect  o : mood normal, affect appropriate          Assessment  · Screening for colon cancer     V76.51/Z12.11  · Annual physical exam     V70.0/Z00.00  · Fatigue     780.79/R53.83  · Vitamin D deficiency     268.9/E55.9  · Weight gain     783.1/R63.5  · Elevated glucose     790.29/R73.09  · Routine lab draw     V72.60/Z01.89  · B12 deficiency     266.2/E53.8       fu as directed  she is on low carb diet..and has lost 10 lbs.  colonoscopy in the summer. consider kristaolose for constipation         Plan  · Orders  o IM - Injection Fee [facility] ([id number]) - 266.2/E53.8 - 04/05/2021  o ACO-14: Influenza immunization administered or previously received [facility] () - - 04/08/2021  o ACO-39: Current medications updated and reviewed (1159F, ) - - 04/05/2021  o Physical, Primary Care Panel (CBC, CMP, Lipid, TSH) [facility] ([id number], [id number], 40820, [id number]) - 780.79/R53.83, 783.1/R63.5, 790.29/R73.09, V72.60/Z01.89 - 10/05/2021  o Vitamin D (25-Hydroxy) Level (21007) - 268.9/E55.9 - 10/05/2021  o B12 Folate levels (B12FO) - 780.79/R53.83, V72.60/Z01.89 - 10/05/2021  o Hgb A1c [facility] ([id number]) - 790.29/R73.09 - 10/05/2021  o COLONOSCOPY REFERRAL (COLON) - V76.51/Z12.11 - 06/23/2021   DR Del Rosario   o Vitamin B12 Injection () - 266.2/E53.8 - 04/05/2021   Injection - Vitamin B12; Dose: 1 ml; Site: Right Deltoid; Route: intramuscular; Date: 04/05/2021 12:10:45; Exp: 05/01/2022; Lot: [id number]; Mfg: Quip; TradeName: cyanocobalamin (vitamin B-12); Location: [facility]; Administered By: Sharyn Cummings MA; Comment: Patient tolerated  well  · Medications  o Medications have been Reconciled  o Transition of Care or Provider Policy  · Instructions  o Reviewed health maintenance flowsheet and updated information. Orders were placed and/or patient's response was documented.  o Patient was educated/instructed on their diagnosis, treatment and medications prior to discharge from the clinic today.  o Counseled on monthly breast self exams.   o Counseled on STD prevention.  o Counseled on diet and exercise.   o Counseled on weight-bearing exercise.  o Recommended Calcium with Vitamin D twice daily.  o Electronically Identified Patient Education Materials Provided Electronically  · Disposition  o Call or Return if symptoms worsen or persist.  o Care Transition            Electronically Signed by: Go Lucio MD -Author on April 8, 2021 05:28:14 AM

## 2021-05-15 VITALS
DIASTOLIC BLOOD PRESSURE: 72 MMHG | OXYGEN SATURATION: 99 % | WEIGHT: 164.19 LBS | TEMPERATURE: 97.1 F | HEART RATE: 60 BPM | HEIGHT: 61 IN | BODY MASS INDEX: 31 KG/M2 | SYSTOLIC BLOOD PRESSURE: 120 MMHG

## 2021-05-15 VITALS
HEART RATE: 73 BPM | TEMPERATURE: 97.2 F | WEIGHT: 166.37 LBS | OXYGEN SATURATION: 99 % | HEIGHT: 61 IN | DIASTOLIC BLOOD PRESSURE: 88 MMHG | BODY MASS INDEX: 31.41 KG/M2 | SYSTOLIC BLOOD PRESSURE: 138 MMHG

## 2021-05-15 VITALS
BODY MASS INDEX: 32.1 KG/M2 | DIASTOLIC BLOOD PRESSURE: 83 MMHG | OXYGEN SATURATION: 98 % | HEART RATE: 71 BPM | WEIGHT: 170 LBS | HEIGHT: 61 IN | SYSTOLIC BLOOD PRESSURE: 124 MMHG

## 2021-05-15 VITALS
HEIGHT: 61 IN | DIASTOLIC BLOOD PRESSURE: 81 MMHG | OXYGEN SATURATION: 98 % | BODY MASS INDEX: 32.2 KG/M2 | WEIGHT: 170.56 LBS | SYSTOLIC BLOOD PRESSURE: 118 MMHG | HEART RATE: 56 BPM | TEMPERATURE: 97.2 F

## 2021-05-16 VITALS
WEIGHT: 172.25 LBS | DIASTOLIC BLOOD PRESSURE: 81 MMHG | HEIGHT: 62 IN | BODY MASS INDEX: 31.7 KG/M2 | OXYGEN SATURATION: 98 % | SYSTOLIC BLOOD PRESSURE: 133 MMHG | HEART RATE: 63 BPM | RESPIRATION RATE: 12 BRPM

## 2021-05-16 VITALS
HEIGHT: 61 IN | WEIGHT: 173.37 LBS | BODY MASS INDEX: 32.73 KG/M2 | SYSTOLIC BLOOD PRESSURE: 123 MMHG | DIASTOLIC BLOOD PRESSURE: 77 MMHG

## 2021-05-16 VITALS
WEIGHT: 173.12 LBS | OXYGEN SATURATION: 99 % | SYSTOLIC BLOOD PRESSURE: 131 MMHG | HEIGHT: 61 IN | TEMPERATURE: 97 F | BODY MASS INDEX: 32.69 KG/M2 | HEART RATE: 78 BPM | DIASTOLIC BLOOD PRESSURE: 76 MMHG

## 2021-05-16 VITALS — BODY MASS INDEX: 30.02 KG/M2 | HEIGHT: 61 IN | WEIGHT: 159 LBS

## 2021-05-16 VITALS
DIASTOLIC BLOOD PRESSURE: 77 MMHG | OXYGEN SATURATION: 98 % | WEIGHT: 159 LBS | TEMPERATURE: 96.8 F | HEART RATE: 65 BPM | BODY MASS INDEX: 30.02 KG/M2 | HEIGHT: 61 IN | SYSTOLIC BLOOD PRESSURE: 129 MMHG

## 2021-05-16 VITALS
HEIGHT: 61 IN | OXYGEN SATURATION: 98 % | SYSTOLIC BLOOD PRESSURE: 123 MMHG | DIASTOLIC BLOOD PRESSURE: 76 MMHG | HEART RATE: 71 BPM | BODY MASS INDEX: 30.81 KG/M2 | TEMPERATURE: 96.7 F | WEIGHT: 163.19 LBS

## 2021-05-24 ENCOUNTER — CONVERSION ENCOUNTER (OUTPATIENT)
Dept: GASTROENTEROLOGY | Facility: CLINIC | Age: 51
End: 2021-05-24
Attending: INTERNAL MEDICINE

## 2021-06-05 NOTE — H&P
History and Physical      Patient Name: Kalpana Marquez   Patient ID: [id number]   Sex: Female   YOB: 1970    Primary Care Provider: Go Lucio MD   Referring Provider: Go Lucio MD    Visit Date: May 24, 2021    Provider: Uche Nance MD   [place] [facility]   Location Address: [address] [place] [address]  [id number]   Location Phone: [phone]          Chief Complaint  · Surgical History and Physical  · Screening Colonoscopy      History Of Present Illness  NON-INPATIENT HISTORY AND PHYSICAL  Allergies: NO KNOWN DRUG ALLERGIES and Latex Exam Gloves   Chief Complaint/History of Present Illness: Screening Colonoscopy   Colon Recall: No   Failed Outpatient Treatment/Contraindications: N/A   Current Medications: amlodipine 10 mg oral tablet, Claritin 10 mg oral tablet, cyanocobalamin (vitamin B-12) 1,000 mcg/mL injection solution, cyclobenzaprine 5 mg oral tablet, diclofenac sodium 75 mg oral tablet,delayed release (DR/EC), ergocalciferol (vitamin D2) 1,250 mcg (50,000 unit) oral capsule, ketotifen fumarate 0.025 % (0.035 %) ophthalmic (eye) drops, meclizine 12.5 mg oral tablet, meloxicam 7.5 mg oral tablet, mometasone 50 mcg/actuation nasal spray,non-aerosol, NuLYTELY with Flavor Packs 420 gram oral recon soln, and Singulair 10 mg oral tablet   Significant Past Medical History: Allergic rhinitis, Arthritis, Depression, Foot pain, Heel pain, Hypertension, Limb Swelling, Numbness in feet, and Seasonal allergies   Significant Family Medical History: No Family History of Colon Cancer   Significant Past Surgical History: Cesarian Section, Colonoscopy, EGD, and Hysterectomy   Previous Colonoscopy: Yes YEAR: 2016 By Whom: Uche Nance MD   Previous EGD: Yes YEAR: 2020 By Whom: Uche Nance MD   PHYSICAL EXAM:  Heart: Regular Rate and Rhythm   Lungs: Breathing Unlabored           Assessment  · Preoperative examination     V72.84/Z01.818  · Screening for colon  cancer     V76.51/Z12.11      Plan  · Orders  o Consent for Colonoscopy Screening -Possible risk/complications, benefits, and alternatives to surgical or invasive procedure have been explained to patient and/or legal gaurdian. -Patient has been evaluated and can tolerate anethesia and/or sedation. Risk, benefits, and alternatives to anesthesia and sedation have been explained to patient or legal gaurdian. () - V72.84/Z01.818, V76.51/Z12.11 - 08/04/2021  · Medications  o Medications have been Reconciled  o Transition of Care or Provider Policy  · Instructions  o ****Surgical Orders****  o ***************  o Outpatient  o ***************  o RISK AND BENEFITS:  o Possible risks/complications, benefits and alternatives to surgical or invasive procedure have been explained to the patient and/or legal guardian.  o Patient has been evaluated and can tolerate anesthesia and/or sedation. Risks, benefits, and alternatives to anesthesia and sedation have been explained to the patient and/or legal guardian.  o ***************  o PREP: Per protocol  o IV: Per Anesthesia  o The above History and Physical Examination has been completed within 30 days of admission.  o This note has been transcribed by ERYN Patel MA. I have read and agree with the findings in this note.  o Electronically Identified Patient Education Materials Provided Electronically  · Disposition  o Call or Return if symptoms worsen or persist.            Electronically Signed by: Johnny Patel, -Author on May 24, 2021 10:52:18 AM

## 2021-06-14 ENCOUNTER — CLINICAL SUPPORT (OUTPATIENT)
Dept: FAMILY MEDICINE CLINIC | Facility: CLINIC | Age: 51
End: 2021-06-14

## 2021-06-14 DIAGNOSIS — E53.8 B12 DEFICIENCY: Primary | ICD-10-CM

## 2021-06-14 PROCEDURE — 96372 THER/PROPH/DIAG INJ SC/IM: CPT | Performed by: FAMILY MEDICINE

## 2021-06-14 RX ORDER — CYANOCOBALAMIN 1000 UG/ML
1000 INJECTION, SOLUTION INTRAMUSCULAR; SUBCUTANEOUS
Status: DISCONTINUED | OUTPATIENT
Start: 2021-06-14 | End: 2022-05-17

## 2021-06-14 RX ADMIN — CYANOCOBALAMIN 1000 MCG: 1000 INJECTION, SOLUTION INTRAMUSCULAR; SUBCUTANEOUS at 09:46

## 2021-07-19 ENCOUNTER — OFFICE VISIT (OUTPATIENT)
Dept: ORTHOPEDIC SURGERY | Facility: CLINIC | Age: 51
End: 2021-07-19

## 2021-07-19 ENCOUNTER — CLINICAL SUPPORT (OUTPATIENT)
Dept: FAMILY MEDICINE CLINIC | Facility: CLINIC | Age: 51
End: 2021-07-19

## 2021-07-19 VITALS — WEIGHT: 160 LBS | HEIGHT: 61 IN | HEART RATE: 59 BPM | BODY MASS INDEX: 30.21 KG/M2 | OXYGEN SATURATION: 95 %

## 2021-07-19 DIAGNOSIS — E53.8 B12 DEFICIENCY: ICD-10-CM

## 2021-07-19 DIAGNOSIS — M22.2X1 PATELLOFEMORAL PAIN SYNDROME OF RIGHT KNEE: ICD-10-CM

## 2021-07-19 DIAGNOSIS — M17.0 PRIMARY OSTEOARTHRITIS OF BOTH KNEES: Primary | ICD-10-CM

## 2021-07-19 PROCEDURE — 99212 OFFICE O/P EST SF 10 MIN: CPT | Performed by: PHYSICIAN ASSISTANT

## 2021-07-19 PROCEDURE — 96372 THER/PROPH/DIAG INJ SC/IM: CPT | Performed by: FAMILY MEDICINE

## 2021-07-19 RX ORDER — POLYETHYLENE GLYCOL 3350, SODIUM CHLORIDE, SODIUM BICARBONATE, POTASSIUM CHLORIDE 420; 11.2; 5.72; 1.48 G/4L; G/4L; G/4L; G/4L
POWDER, FOR SOLUTION ORAL
Status: ON HOLD | COMMUNITY
Start: 2021-05-24 | End: 2021-08-04

## 2021-07-19 RX ORDER — MELOXICAM 7.5 MG/1
7.5 TABLET ORAL DAILY
COMMUNITY
Start: 2021-07-11 | End: 2022-11-22

## 2021-07-19 RX ORDER — DICLOFENAC SODIUM 75 MG/1
75 TABLET, DELAYED RELEASE ORAL DAILY
COMMUNITY
Start: 2021-06-28 | End: 2022-11-22

## 2021-07-19 RX ORDER — ERGOCALCIFEROL 1.25 MG/1
50000 CAPSULE ORAL
COMMUNITY
Start: 2021-04-21 | End: 2021-11-11

## 2021-07-19 RX ORDER — CYCLOBENZAPRINE HCL 5 MG
5 TABLET ORAL NIGHTLY PRN
COMMUNITY
Start: 2021-05-20 | End: 2022-09-06 | Stop reason: SDUPTHER

## 2021-07-19 RX ORDER — AMLODIPINE BESYLATE 10 MG/1
10 TABLET ORAL DAILY
COMMUNITY
Start: 2021-07-11 | End: 2021-08-10

## 2021-07-19 RX ADMIN — CYANOCOBALAMIN 1000 MCG: 1000 INJECTION, SOLUTION INTRAMUSCULAR; SUBCUTANEOUS at 10:12

## 2021-07-19 NOTE — PROGRESS NOTES
"Chief Complaint  Pain of the Left Knee and Pain of the Right Knee    Subjective          Kalpana Marquez presents to [facility] for follow up of bilateral knee pain, bilateral knee osteoarthritis. She reports having bilateral knee pain with standing for long periods. She has received intermittent injections for her knees in the past, which she states gives her relief. She also has taken NSAID's, which she states help some.       Objective   Vital Signs:   Pulse 59   Ht 154.9 cm (61\")   Wt 72.6 kg (160 lb)   SpO2 95%   BMI 30.23 kg/m²       Physical Exam  Constitutional:       Appearance: Normal appearance. He is well-developed and normal weight.   HENT:      Head: Normocephalic.      Right Ear: Hearing and external ear normal.      Left Ear: Hearing and external ear normal.      Nose: Nose normal.   Eyes:      Conjunctiva/sclera: Conjunctivae normal.   Cardiovascular:      Rate and Rhythm: Normal rate.   Pulmonary:      Effort: Pulmonary effort is normal.      Breath sounds: No wheezing or rales.   Abdominal:      Palpations: Abdomen is soft.      Tenderness: There is no abdominal tenderness.   Musculoskeletal:      Cervical back: Normal range of motion.   Skin:     Findings: No rash.   Neurological:      Mental Status: He is alert and oriented to person, place, and time.   Psychiatric:         Mood and Affect: Mood and affect normal.         Judgment: Judgment normal.     Ortho Exam  Right knee: Gait is normal.  Full weightbearing.  No tenderness, atrophy or swelling.  Full active range of motion of the knee.  Patella well tracking, palpable patellofemoral crepitation.  Stable varus valgus stress.  Negative Shauna's.  Good muscle tone of the quadriceps and hamstrings muscles.  Normal dorsi and plantar flexion.  Sensation is intact.  Calf soft and nontender.  Neurovascular intact.    Left knee: No tenderness, atrophy or swelling.  No skin discoloration or deformity.  Patella well " tracking.  Full active range of motion of the kne with e flexion and extension.  Stable to varus and valgus stress.  Normal plantar and dorsiflexion.  Negative Shauna's.  Sensation is intact.  Neurovascular intact.  Posterior tibialis pulses 2+.  Result Review :            Imaging Results (Most Recent)     None                Assessment and Plan {CC Problem List  Visit Diagnosis  ROS  Review (Popup)  Health Maintenance  Quality  BestPractice  Medications  SmartSets  SnapShot Encounters  Media :23}   Problem List Items Addressed This Visit        Musculoskeletal and Injuries    Primary osteoarthritis of both knees - Primary    Patellofemoral pain syndrome of right knee          Follow Up   Return if symptoms worsen or fail to improve.  Patient Instructions    Patient given braces for use during working.   Recommend 2-3 weeks of consistent use of NSAID's.   Follow up PRN.     Patient was given instructions and counseling regarding her condition or for health maintenance advice. Please see specific information pulled into the AVS if appropriate.

## 2021-08-03 NOTE — PRE-PROCEDURE INSTRUCTIONS
INSTRUCTED ON LAXATIVE PREP-PRE OP MEDS-CLEAR LIQUID DIET-SMALL SIP OF WATER WITH  MEDS    MEDS TO TAKE    AMLODIPINE  DICLOFENAC

## 2021-08-04 ENCOUNTER — ANESTHESIA (OUTPATIENT)
Dept: GASTROENTEROLOGY | Facility: HOSPITAL | Age: 51
End: 2021-08-04

## 2021-08-04 ENCOUNTER — HOSPITAL ENCOUNTER (OUTPATIENT)
Facility: HOSPITAL | Age: 51
Setting detail: HOSPITAL OUTPATIENT SURGERY
Discharge: HOME | End: 2021-08-04
Attending: INTERNAL MEDICINE | Admitting: INTERNAL MEDICINE

## 2021-08-04 ENCOUNTER — ANESTHESIA EVENT (OUTPATIENT)
Dept: GASTROENTEROLOGY | Facility: HOSPITAL | Age: 51
End: 2021-08-04

## 2021-08-04 VITALS
BODY MASS INDEX: 29.89 KG/M2 | TEMPERATURE: 97.5 F | DIASTOLIC BLOOD PRESSURE: 77 MMHG | WEIGHT: 158.29 LBS | SYSTOLIC BLOOD PRESSURE: 136 MMHG | RESPIRATION RATE: 16 BRPM | OXYGEN SATURATION: 98 % | HEART RATE: 59 BPM | HEIGHT: 61 IN

## 2021-08-04 PROCEDURE — 45378 DIAGNOSTIC COLONOSCOPY: CPT | Performed by: INTERNAL MEDICINE

## 2021-08-04 PROCEDURE — 25010000002 PROPOFOL 10 MG/ML EMULSION: Performed by: NURSE ANESTHETIST, CERTIFIED REGISTERED

## 2021-08-04 RX ORDER — PHENYLEPHRINE HCL IN 0.9% NACL 1 MG/10 ML
SYRINGE (ML) INTRAVENOUS AS NEEDED
Status: DISCONTINUED | OUTPATIENT
Start: 2021-08-04 | End: 2021-08-04 | Stop reason: SURG

## 2021-08-04 RX ORDER — SODIUM CHLORIDE, SODIUM LACTATE, POTASSIUM CHLORIDE, CALCIUM CHLORIDE 600; 310; 30; 20 MG/100ML; MG/100ML; MG/100ML; MG/100ML
30 INJECTION, SOLUTION INTRAVENOUS CONTINUOUS
Status: DISCONTINUED | OUTPATIENT
Start: 2021-08-04 | End: 2021-08-04 | Stop reason: HOSPADM

## 2021-08-04 RX ORDER — LIDOCAINE HYDROCHLORIDE 20 MG/ML
INJECTION, SOLUTION INFILTRATION; PERINEURAL AS NEEDED
Status: DISCONTINUED | OUTPATIENT
Start: 2021-08-04 | End: 2021-08-04 | Stop reason: SURG

## 2021-08-04 RX ORDER — GLYCOPYRROLATE 0.2 MG/ML
INJECTION INTRAMUSCULAR; INTRAVENOUS AS NEEDED
Status: DISCONTINUED | OUTPATIENT
Start: 2021-08-04 | End: 2021-08-04 | Stop reason: SURG

## 2021-08-04 RX ADMIN — GLYCOPYRROLATE 0.2 MCG: 0.2 INJECTION INTRAMUSCULAR; INTRAVENOUS at 07:52

## 2021-08-04 RX ADMIN — PROPOFOL 250 MCG/KG/MIN: 10 INJECTION, EMULSION INTRAVENOUS at 07:50

## 2021-08-04 RX ADMIN — Medication 100 MCG: at 07:52

## 2021-08-04 RX ADMIN — LIDOCAINE HYDROCHLORIDE 100 MG: 20 INJECTION, SOLUTION INFILTRATION; PERINEURAL at 07:50

## 2021-08-04 RX ADMIN — SODIUM CHLORIDE, POTASSIUM CHLORIDE, SODIUM LACTATE AND CALCIUM CHLORIDE 30 ML/HR: 600; 310; 30; 20 INJECTION, SOLUTION INTRAVENOUS at 07:32

## 2021-08-04 NOTE — ANESTHESIA PREPROCEDURE EVALUATION
Anesthesia Evaluation     Patient summary reviewed and Nursing notes reviewed   no history of anesthetic complications:  NPO Solid Status: > 8 hours  NPO Liquid Status: > 2 hours           Airway   Mallampati: II  TM distance: >3 FB  Neck ROM: full  No difficulty expected  Dental      Pulmonary - normal exam    breath sounds clear to auscultation  (+) a smoker Former,   Cardiovascular - normal exam  Exercise tolerance: good (4-7 METS)    Rhythm: regular    (+) hypertension,       Neuro/Psych  (+) psychiatric history Depression,     GI/Hepatic/Renal/Endo - negative ROS     Musculoskeletal     Abdominal    Substance History - negative use     OB/GYN negative ob/gyn ROS         Other   arthritis,                      Anesthesia Plan    ASA 2     general   total IV anesthesia(Patient understands anesthesia not responsible for dental damage.)  intravenous induction     Anesthetic plan, all risks, benefits, and alternatives have been provided, discussed and informed consent has been obtained with: patient.  Use of blood products discussed with patient .   Plan discussed with CRNA.

## 2021-08-04 NOTE — H&P
"ScreeningPre Procedure History & Physical    Chief Complaint:   Screening     Subjective     HPI:   Screening     Past Medical History:   Past Medical History:   Diagnosis Date   • Allergic rhinitis    • Arthritis    • Constipation    • Depression    • Foot pain    • Heel pain    • Hypertension    • Limb swelling    • Numbness in feet    • Seasonal allergies        Past Surgical History:  Past Surgical History:   Procedure Laterality Date   •  SECTION      x 6   • COLONOSCOPY     • ENDOSCOPY  ,    • HYSTERECTOMY         Family History:  Family History   Problem Relation Age of Onset   • Diabetes Mother    • Arthritis Mother    • Stroke Father    • Cancer Father    • Diabetes Sister    • Malig Hyperthermia Neg Hx        Social History:   reports that she quit smoking yesterday. She smoked 0.50 packs per day. She has never used smokeless tobacco. She reports that she does not drink alcohol and does not use drugs.    Medications:   Facility-Administered Medications Prior to Admission   Medication Dose Route Frequency Provider Last Rate Last Admin   • cyanocobalamin injection 1,000 mcg  1,000 mcg Intramuscular Q30 Days Go Lucio MD   1,000 mcg at 21 1012     Medications Prior to Admission   Medication Sig Dispense Refill Last Dose   • amLODIPine (NORVASC) 10 MG tablet Take 10 mg by mouth Daily.   2021 at 0530   • cyclobenzaprine (FLEXERIL) 5 MG tablet Take 5 mg by mouth At Night As Needed.   Past Month at Unknown time   • diclofenac (VOLTAREN) 75 MG EC tablet    8/3/2021 at Unknown time   • meloxicam (MOBIC) 7.5 MG tablet    Past Week at Unknown time   • vitamin D (ERGOCALCIFEROL) 1.25 MG (08780 UT) capsule capsule Take 50,000 Units by mouth Every 7 (Seven) Days.   Past Week at Unknown time       Allergies:  Latex        Objective     Blood pressure 137/80, pulse 60, temperature 97 °F (36.1 °C), temperature source Temporal, resp. rate 16, height 154.9 cm (60.98\"), weight 71.8 kg (158 lb " 4.6 oz), SpO2 98 %.    Physical Exam   Constitutional: Pt is oriented to person, place, and time and well-developed, well-nourished, and in no distress.   Mouth/Throat: Oropharynx is clear and moist.   Neck: Normal range of motion.   Cardiovascular: Normal rate, regular rhythm and normal heart sounds.    Pulmonary/Chest: Effort normal and breath sounds normal.   Abdominal: Soft. Nontender  Skin: Skin is warm and dry.   Psychiatric: Mood, memory, affect and judgment normal.     Assessment/Plan     Diagnosis:  Screening colonoscopy  H/o colon polyps     Anticipated Surgical Procedure:  colonoscopy    The risks, benefits, and alternatives of this procedure have been discussed with the patient or the responsible party- the patient understands and agrees to proceed.

## 2021-08-04 NOTE — ANESTHESIA POSTPROCEDURE EVALUATION
Patient: Kalpana Marquez    Procedure Summary     Date: 08/04/21 Room / Location: [facility] / [facility]    Anesthesia Start: 0748 Anesthesia Stop: 0810    Procedure: COLONOSCOPY (N/A ) Diagnosis: (SCREENING)    Surgeons: Uche Nance MD Provider: Marta Rome MD    Anesthesia Type: general ASA Status: 2          Anesthesia Type: general    Vitals  Vitals Value Taken Time   /79 08/04/21 0819   Temp 36.1 °C (97 °F) 08/04/21 0810   Pulse 62 08/04/21 0819   Resp 16 08/04/21 0819   SpO2 97 % 08/04/21 0819           Post Anesthesia Care and Evaluation    Patient location during evaluation: bedside  Patient participation: complete - patient participated  Level of consciousness: awake  Pain score: 0  Pain management: adequate  Airway patency: patent  Anesthetic complications: No anesthetic complications  PONV Status: none  Cardiovascular status: acceptable and stable  Respiratory status: acceptable and room air  Hydration status: acceptable    Comments: An Anesthesiologist personally participated in the most demanding procedures (including induction and emergence if applicable) in the anesthesia plan, monitored the course of anesthesia administration at frequent intervals and remained physically present and available for immediate diagnosis and treatment of emergencies.

## 2021-08-10 DIAGNOSIS — I10 HYPERTENSION, UNSPECIFIED TYPE: Primary | ICD-10-CM

## 2021-08-10 RX ORDER — AMLODIPINE BESYLATE 10 MG/1
TABLET ORAL
Qty: 90 TABLET | Refills: 1 | Status: SHIPPED | OUTPATIENT
Start: 2021-08-10 | End: 2022-02-03

## 2021-08-25 ENCOUNTER — CLINICAL SUPPORT (OUTPATIENT)
Dept: FAMILY MEDICINE CLINIC | Facility: CLINIC | Age: 51
End: 2021-08-25

## 2021-08-25 DIAGNOSIS — D51.0 PERNICIOUS ANEMIA: ICD-10-CM

## 2021-08-25 PROCEDURE — 96372 THER/PROPH/DIAG INJ SC/IM: CPT | Performed by: FAMILY MEDICINE

## 2021-08-25 RX ADMIN — CYANOCOBALAMIN 1000 MCG: 1000 INJECTION, SOLUTION INTRAMUSCULAR; SUBCUTANEOUS at 12:05

## 2021-09-24 ENCOUNTER — LAB (OUTPATIENT)
Dept: LAB | Facility: HOSPITAL | Age: 51
End: 2021-09-24

## 2021-09-24 ENCOUNTER — TRANSCRIBE ORDERS (OUTPATIENT)
Dept: LAB | Facility: HOSPITAL | Age: 51
End: 2021-09-24

## 2021-09-24 DIAGNOSIS — E55.9 VITAMIN D DEFICIENCY: ICD-10-CM

## 2021-09-24 DIAGNOSIS — R53.83 TIREDNESS: ICD-10-CM

## 2021-09-24 DIAGNOSIS — R53.83 TIREDNESS: Primary | ICD-10-CM

## 2021-09-24 DIAGNOSIS — R63.5 WEIGHT GAIN: ICD-10-CM

## 2021-09-24 DIAGNOSIS — R73.09 IMPAIRED GLUCOSE TOLERANCE TEST: ICD-10-CM

## 2021-09-24 LAB
25(OH)D3 SERPL-MCNC: 42.1 NG/ML
ALBUMIN SERPL-MCNC: 4.7 G/DL (ref 3.5–5.2)
ALBUMIN/GLOB SERPL: 2 G/DL
ALP SERPL-CCNC: 65 U/L (ref 39–117)
ALT SERPL W P-5'-P-CCNC: 17 U/L (ref 1–33)
ANION GAP SERPL CALCULATED.3IONS-SCNC: 7.7 MMOL/L (ref 5–15)
AST SERPL-CCNC: 17 U/L (ref 1–32)
BASOPHILS # BLD AUTO: 0.02 10*3/MM3 (ref 0–0.2)
BASOPHILS NFR BLD AUTO: 0.5 % (ref 0–1.5)
BILIRUB SERPL-MCNC: 0.5 MG/DL (ref 0–1.2)
BUN SERPL-MCNC: 13 MG/DL (ref 6–20)
BUN/CREAT SERPL: 18.1 (ref 7–25)
CALCIUM SPEC-SCNC: 9.6 MG/DL (ref 8.6–10.5)
CHLORIDE SERPL-SCNC: 105 MMOL/L (ref 98–107)
CHOLEST SERPL-MCNC: 194 MG/DL (ref 0–200)
CO2 SERPL-SCNC: 28.3 MMOL/L (ref 22–29)
CREAT SERPL-MCNC: 0.72 MG/DL (ref 0.57–1)
DEPRECATED RDW RBC AUTO: 39.8 FL (ref 37–54)
EOSINOPHIL # BLD AUTO: 0.11 10*3/MM3 (ref 0–0.4)
EOSINOPHIL NFR BLD AUTO: 2.6 % (ref 0.3–6.2)
ERYTHROCYTE [DISTWIDTH] IN BLOOD BY AUTOMATED COUNT: 11.7 % (ref 12.3–15.4)
FOLATE SERPL-MCNC: 9.88 NG/ML (ref 4.78–24.2)
GFR SERPL CREATININE-BSD FRML MDRD: 103 ML/MIN/1.73
GFR SERPL CREATININE-BSD FRML MDRD: 85 ML/MIN/1.73
GLOBULIN UR ELPH-MCNC: 2.4 GM/DL
GLUCOSE SERPL-MCNC: 102 MG/DL (ref 65–99)
HBA1C MFR BLD: 4.8 % (ref 4.8–5.6)
HCT VFR BLD AUTO: 41.5 % (ref 34–46.6)
HDLC SERPL-MCNC: 53 MG/DL (ref 40–60)
HGB BLD-MCNC: 14.3 G/DL (ref 12–15.9)
IMM GRANULOCYTES # BLD AUTO: 0.01 10*3/MM3 (ref 0–0.05)
IMM GRANULOCYTES NFR BLD AUTO: 0.2 % (ref 0–0.5)
LDLC SERPL CALC-MCNC: 131 MG/DL (ref 0–100)
LDLC/HDLC SERPL: 2.46 {RATIO}
LYMPHOCYTES # BLD AUTO: 1.19 10*3/MM3 (ref 0.7–3.1)
LYMPHOCYTES NFR BLD AUTO: 28.5 % (ref 19.6–45.3)
MCH RBC QN AUTO: 32.1 PG (ref 26.6–33)
MCHC RBC AUTO-ENTMCNC: 34.5 G/DL (ref 31.5–35.7)
MCV RBC AUTO: 93.3 FL (ref 79–97)
MONOCYTES # BLD AUTO: 0.38 10*3/MM3 (ref 0.1–0.9)
MONOCYTES NFR BLD AUTO: 9.1 % (ref 5–12)
NEUTROPHILS NFR BLD AUTO: 2.47 10*3/MM3 (ref 1.7–7)
NEUTROPHILS NFR BLD AUTO: 59.1 % (ref 42.7–76)
NRBC BLD AUTO-RTO: 0 /100 WBC (ref 0–0.2)
PLATELET # BLD AUTO: 221 10*3/MM3 (ref 140–450)
PMV BLD AUTO: 10.6 FL (ref 6–12)
POTASSIUM SERPL-SCNC: 3.9 MMOL/L (ref 3.5–5.2)
PROT SERPL-MCNC: 7.1 G/DL (ref 6–8.5)
RBC # BLD AUTO: 4.45 10*6/MM3 (ref 3.77–5.28)
SODIUM SERPL-SCNC: 141 MMOL/L (ref 136–145)
TRIGL SERPL-MCNC: 54 MG/DL (ref 0–150)
TSH SERPL DL<=0.05 MIU/L-ACNC: 1.31 UIU/ML (ref 0.27–4.2)
VIT B12 BLD-MCNC: 474 PG/ML (ref 211–946)
VLDLC SERPL-MCNC: 10 MG/DL (ref 5–40)
WBC # BLD AUTO: 4.18 10*3/MM3 (ref 3.4–10.8)

## 2021-09-24 PROCEDURE — 82607 VITAMIN B-12: CPT

## 2021-09-24 PROCEDURE — 36415 COLL VENOUS BLD VENIPUNCTURE: CPT

## 2021-09-24 PROCEDURE — 84443 ASSAY THYROID STIM HORMONE: CPT

## 2021-09-24 PROCEDURE — 82746 ASSAY OF FOLIC ACID SERUM: CPT

## 2021-09-24 PROCEDURE — 80053 COMPREHEN METABOLIC PANEL: CPT

## 2021-09-24 PROCEDURE — 85025 COMPLETE CBC W/AUTO DIFF WBC: CPT

## 2021-09-24 PROCEDURE — 80061 LIPID PANEL: CPT

## 2021-09-24 PROCEDURE — 82306 VITAMIN D 25 HYDROXY: CPT

## 2021-09-24 PROCEDURE — 83036 HEMOGLOBIN GLYCOSYLATED A1C: CPT

## 2021-09-27 ENCOUNTER — OFFICE VISIT (OUTPATIENT)
Dept: FAMILY MEDICINE CLINIC | Facility: CLINIC | Age: 51
End: 2021-09-27

## 2021-09-27 VITALS
SYSTOLIC BLOOD PRESSURE: 126 MMHG | WEIGHT: 161.5 LBS | BODY MASS INDEX: 30.53 KG/M2 | DIASTOLIC BLOOD PRESSURE: 74 MMHG | OXYGEN SATURATION: 98 % | HEART RATE: 87 BPM | RESPIRATION RATE: 18 BRPM | TEMPERATURE: 95.9 F

## 2021-09-27 DIAGNOSIS — E55.9 VITAMIN D DEFICIENCY: ICD-10-CM

## 2021-09-27 DIAGNOSIS — M79.671 BILATERAL FOOT PAIN: Primary | ICD-10-CM

## 2021-09-27 DIAGNOSIS — M79.672 BILATERAL FOOT PAIN: Primary | ICD-10-CM

## 2021-09-27 DIAGNOSIS — R53.83 FATIGUE, UNSPECIFIED TYPE: ICD-10-CM

## 2021-09-27 DIAGNOSIS — M54.6 THORACIC BACK PAIN, UNSPECIFIED BACK PAIN LATERALITY, UNSPECIFIED CHRONICITY: ICD-10-CM

## 2021-09-27 DIAGNOSIS — I10 ESSENTIAL (PRIMARY) HYPERTENSION: ICD-10-CM

## 2021-09-27 PROCEDURE — 99214 OFFICE O/P EST MOD 30 MIN: CPT | Performed by: FAMILY MEDICINE

## 2021-09-27 RX ORDER — GABAPENTIN 300 MG/1
300 CAPSULE ORAL 2 TIMES DAILY
Qty: 60 CAPSULE | Refills: 3 | Status: SHIPPED | OUTPATIENT
Start: 2021-09-27 | End: 2022-10-11 | Stop reason: SDUPTHER

## 2021-09-27 NOTE — PROGRESS NOTES
Chief Complaint   Patient presents with   • Vitamin D Deficiency     6 month follow up      Subjective   Kalpana Marquez is a 51 y.o. female who presents to the office for follow-up.     PT presents for f/u and lab review.    Hx of low B12. Still mildly low. She is on B12 injections once a month.    Hx of allergic rhinits controlled.     Hx of HTN. COntrolled.    hx of narcolepsy...she doesnt want to take medication for it.    Pt c/o bilateral foot pain/like feet are on fire.    She also c/o right sided back pain for a few weeks.     hx of arthritis..controlled.  The following portions of the patient's history were reviewed and updated as appropriate: allergies, current medications, past family history, past medical history, past social history, past surgical history and problem list.    Review of Systems   Constitutional: Negative for chills, fever and unexpected weight loss.   Respiratory: Negative for cough, chest tightness and shortness of breath.    Cardiovascular: Negative for chest pain and palpitations.   Gastrointestinal: Negative for abdominal pain, constipation, diarrhea, nausea and vomiting.        Objective   Vitals:    09/27/21 0800   BP: 126/74   BP Location: Left arm   Patient Position: Sitting   Cuff Size: Adult   Pulse: 87   Resp: 18   Temp: 95.9 °F (35.5 °C)   SpO2: 98%   Weight: 73.3 kg (161 lb 8 oz)     Body mass index is 30.53 kg/m².  Physical Exam  Vitals reviewed.   Constitutional:       General: She is not in acute distress.     Appearance: Normal appearance. She is not ill-appearing.   HENT:      Head: Normocephalic.   Eyes:      Conjunctiva/sclera: Conjunctivae normal.   Cardiovascular:      Rate and Rhythm: Normal rate and regular rhythm.   Pulmonary:      Effort: Pulmonary effort is normal.      Breath sounds: Normal breath sounds.   Skin:     Findings: No lesion or rash.   Neurological:      Mental Status: She is alert and oriented to person, place, and time.   Psychiatric:         Mood  and Affect: Mood normal.          Assessment/Plan   Diagnoses and all orders for this visit:    1. Bilateral foot pain (Primary)  -     Ambulatory Referral to Podiatry    2. Vitamin D deficiency  -     Vitamin D 25 Hydroxy; Future    3. Fatigue, unspecified type  -     Vitamin B12 & Folate; Future    4. Essential (primary) hypertension  Comments:  controlled.  Orders:  -     CBC Auto Differential; Future  -     Comprehensive Metabolic Panel; Future  -     Lipid Panel; Future  -     TSH; Future  -     T4, Free; Future  -     Microalbumin / Creatinine Urine Ratio - Urine, Clean Catch; Future  -     Hemoglobin A1c; Future    5. Thoracic back pain, unspecified back pain laterality, unspecified chronicity  -     gabapentin (NEURONTIN) 300 MG capsule; Take 1 capsule by mouth 2 (two) times a day.  Dispense: 60 capsule; Refill: 3  -     Ambulatory Referral to Physical Therapy  -     XR Spine Thoracic 2 View               Return in about 6 months (around 3/27/2022) for Annual physical.   PHQ-2/PHQ-9 Depression Screening 9/27/2021   Little interest or pleasure in doing things 0   Feeling down, depressed, or hopeless 0   Total Score 0

## 2021-09-28 ENCOUNTER — HOSPITAL ENCOUNTER (OUTPATIENT)
Dept: GENERAL RADIOLOGY | Facility: HOSPITAL | Age: 51
Discharge: HOME | End: 2021-09-28
Admitting: FAMILY MEDICINE

## 2021-09-28 PROCEDURE — 72070 X-RAY EXAM THORAC SPINE 2VWS: CPT

## 2021-10-04 ENCOUNTER — TELEPHONE (OUTPATIENT)
Dept: FAMILY MEDICINE CLINIC | Facility: CLINIC | Age: 51
End: 2021-10-04

## 2021-10-04 NOTE — TELEPHONE ENCOUNTER
Caller: Kalpana Marquez    Relationship: Self    Best call back number: [phone]    What is the best time to reach you: AFTER 4 PLEASE     Who are you requesting to speak with (clinical staff, provider,  specific staff member): NURSE     What was the call regarding: PATIENT IS RETURNING A CALL FROM SOMEONE ABOUT HER XRAY'S.     Do you require a callback: YES

## 2021-10-05 ENCOUNTER — CLINICAL SUPPORT (OUTPATIENT)
Dept: FAMILY MEDICINE CLINIC | Facility: CLINIC | Age: 51
End: 2021-10-05

## 2021-10-05 DIAGNOSIS — E53.8 B12 DEFICIENCY: Primary | ICD-10-CM

## 2021-10-05 PROCEDURE — 96372 THER/PROPH/DIAG INJ SC/IM: CPT | Performed by: FAMILY MEDICINE

## 2021-10-05 RX ORDER — CYANOCOBALAMIN 1000 UG/ML
1000 INJECTION, SOLUTION INTRAMUSCULAR; SUBCUTANEOUS
Status: DISCONTINUED | OUTPATIENT
Start: 2021-10-05 | End: 2022-06-21

## 2021-10-05 RX ADMIN — CYANOCOBALAMIN 1000 MCG: 1000 INJECTION, SOLUTION INTRAMUSCULAR; SUBCUTANEOUS at 16:29

## 2021-11-11 RX ORDER — ERGOCALCIFEROL 1.25 MG/1
CAPSULE ORAL
Qty: 12 CAPSULE | Refills: 1 | Status: SHIPPED | OUTPATIENT
Start: 2021-11-11 | End: 2022-06-21 | Stop reason: SDUPTHER

## 2021-11-16 ENCOUNTER — CLINICAL SUPPORT (OUTPATIENT)
Dept: FAMILY MEDICINE CLINIC | Facility: CLINIC | Age: 51
End: 2021-11-16

## 2021-11-16 DIAGNOSIS — E53.8 B12 DEFICIENCY: ICD-10-CM

## 2021-11-16 PROCEDURE — 96372 THER/PROPH/DIAG INJ SC/IM: CPT | Performed by: FAMILY MEDICINE

## 2021-11-16 RX ADMIN — CYANOCOBALAMIN 1000 MCG: 1000 INJECTION, SOLUTION INTRAMUSCULAR; SUBCUTANEOUS at 15:29

## 2021-11-23 ENCOUNTER — TREATMENT (OUTPATIENT)
Dept: PHYSICAL THERAPY | Facility: CLINIC | Age: 51
End: 2021-11-23

## 2021-11-23 DIAGNOSIS — M79.672 FOOT PAIN, BILATERAL: Primary | ICD-10-CM

## 2021-11-23 DIAGNOSIS — M79.671 FOOT PAIN, BILATERAL: Primary | ICD-10-CM

## 2021-11-23 DIAGNOSIS — M54.6 THORACIC BACK PAIN, UNSPECIFIED BACK PAIN LATERALITY, UNSPECIFIED CHRONICITY: Primary | ICD-10-CM

## 2021-11-23 PROCEDURE — 97161 PT EVAL LOW COMPLEX 20 MIN: CPT | Performed by: PHYSICAL THERAPIST

## 2021-11-23 NOTE — TELEPHONE ENCOUNTER
Need a referral to [facility] please dr aguilar/naomi office the foot doctor said she needed to go to them there was nothing more that he could do

## 2021-11-23 NOTE — TELEPHONE ENCOUNTER
I did sign for the neurosurg, but was it neurology she needed to go to?  Was is the issue at hand? I just want to make sure we are all on the same page.

## 2021-11-23 NOTE — PROGRESS NOTES
Physical Therapy Initial Evaluation and Plan of Care      Patient: Kalpana Marquez   : 1970  Diagnosis/ICD-10 Code:  Thoracic back pain, unspecified back pain laterality, unspecified chronicity [M54.6]  Referring practitioner: Go Lucio MD  Date of Initial Visit: 2021  Today's Date: 2021  Patient seen for 1 sessions           Subjective Questionnaire: QuickDASH: 36=40-59% limitation      Subjective Evaluation    History of Present Illness  Mechanism of injury: Patient is a 51 yr old female referred to physical therapy with diagnosis of thoracic pain.  Patient reports right sided scapular/mid back pain for years and it is getting worse. Patient reports pain with cleaning/household activity. Patient is right hand dominate.     Pain  At worst pain ratin  Relieving factors: medications, change in position and rest    Patient Goals  Patient goals for therapy: decreased pain             Objective          Postural Observations    Additional Postural Observation Details  Noted forward head and forward rolled shoulders; winged scapulas with increase thoracic kyphosis    Palpation     Right   Hypertonic in the upper trapezius. Tenderness of the levator scapulae, lower trapezius, middle trapezius, rhomboids and upper trapezius.   Trigger point to levator scapulae and middle trapezius.     Tenderness   Cervical Spine   Tenderness in the right scapula.     Neurological Testing     Sensation   Cervical/Thoracic   Left   Intact: light touch    Right   Intact: light touch    Strength/Myotome Testing     Left Shoulder     Planes of Motion   Flexion: 4+   Extension: 4+   Abduction: 4+     Isolated Muscles   Lower trapezius: 4-   Middle trapezius: 4-     Right Shoulder     Planes of Motion   Flexion: 4+   Extension: 4+   Abduction: 4+     Isolated Muscles   Lower trapezius: 4-   Middle trapezius: 4-     Cervical Flexibility Comments:   Noted upper trap and levator scapula tightness          Assessment & Plan      Assessment  Impairments: activity intolerance, impaired physical strength, lacks appropriate home exercise program and pain with function    Assessment details: Pt presents with limitations, noted by evaluation that impede patient's ability to tolerate functional activity with right upper extremity.  The skills of a therapist will be required to safely and effectively implement the following treatment plan to restore maximal level of function.    Prognosis: good    Goals  Plan Goals: 1. Carrying, Moving, and Handling Objects Functional Limitation     LTG 1: 8 weeks:  The patient will demonstrate 1-19% limitation by achieving a score of 18 on the Quick DASH.   STATUS:  New   STG 1a: 4 weeks:  The patient will demonstrate 20-39% limitation by achieving a score of 27 on the Quick DASH.     STATUS:  New      2. The patient has limited strength of the scapular mm  (mid and lower trap)  LTG 2: 8 weeks:  The patient will demonstrate 4+/5 strength for bilateral scapular strength in order to demonstrate improved posture/decrease pain.   STATUS:  New   STG 2a: 4 weeks:  The patient will demonstrate 4/5 strength for scapular strength  STATUS:  New   STG2b:  2 weeks:  The patient will be independent with home exercises.    STATUS:  New      3. The patient complains of pain to the right scapula/mid back.   LTG 3: 8 weeks:  The patient will report a pain rating of 2/10 or better in order to improve sleep quality and tolerance to performance of activities of daily living.   STATUS:  New   STG 3a: 4 weeks:  The patient will report a pain rating of 4/10 or better.    STATUS:  New     Plan  Therapy options: will be seen for skilled therapy services  Planned modality interventions: cryotherapy, TENS and thermotherapy (hydrocollator packs)  Planned therapy interventions: manual therapy, soft tissue mobilization, postural training, therapeutic activities, stretching, strengthening, spinal/joint mobilization, functional ROM  exercises, home exercise program and flexibility  Frequency: 2x week  Duration in weeks: 8  Treatment plan discussed with: patient        Visit Diagnoses:    ICD-10-CM ICD-9-CM   1. Thoracic back pain, unspecified back pain laterality, unspecified chronicity  M54.6 724.1       Timed:  Manual Therapy:         mins  43162;  Therapeutic Exercise:         mins  96801;     Neuromuscular Ann:        mins  37224;    Therapeutic Activity:          mins  84122;     Gait Training:           mins  20373;     Ultrasound:          mins  70461;    Electrical Stimulation:         mins  05320 ( );    Untimed:  Electrical Stimulation:         mins  19447 ( );  Mechanical Traction:         mins  97163;   PT evaluation   38 mins    Timed Treatment:      mins   Total Treatment:     38   mins    PT SIGNATURE: Candy Frost PT     Electronically singed 11/23/2021    [place] PT license: [id number]        Initial Certification  Certification Period: 11/23/2021 thru 2/20/2022  I certify that the therapy services are furnished while this patient is under my care.  The services outlined above are required by this patient, and will be reviewed every 90 days.     PHYSICIAN: Go Lucio MD      DATE:     Please sign and return via fax to [phone]  Thank you, [facility].

## 2021-12-03 ENCOUNTER — TELEPHONE (OUTPATIENT)
Dept: FAMILY MEDICINE CLINIC | Facility: CLINIC | Age: 51
End: 2021-12-03

## 2021-12-03 DIAGNOSIS — Z51.6 DESENSITIZATION TO ALLERGY SHOT: Primary | ICD-10-CM

## 2021-12-10 ENCOUNTER — TREATMENT (OUTPATIENT)
Dept: PHYSICAL THERAPY | Facility: CLINIC | Age: 51
End: 2021-12-10

## 2021-12-10 DIAGNOSIS — M54.6 THORACIC BACK PAIN, UNSPECIFIED BACK PAIN LATERALITY, UNSPECIFIED CHRONICITY: Primary | ICD-10-CM

## 2021-12-10 PROCEDURE — 97140 MANUAL THERAPY 1/> REGIONS: CPT | Performed by: PHYSICAL THERAPIST

## 2021-12-10 PROCEDURE — 97110 THERAPEUTIC EXERCISES: CPT | Performed by: PHYSICAL THERAPIST

## 2021-12-10 NOTE — PROGRESS NOTES
Physical Therapy Daily Progress Note        Patient: Kalpana Marquez   : 1970  Diagnosis/ICD-10 Code:  Thoracic back pain, unspecified back pain laterality, unspecified chronicity [M54.6]  Referring practitioner: Go Lucio MD  Date of Initial Visit: Type: THERAPY  Noted: 2021  Today's Date: 12/10/2021  Patient seen for 2 sessions             Subjective   Kalpana Marquez reports: still having pain, more in her back than in her shoulder.     Objective   No complaints of increased pain or discomfort. Just fatigued following strengthening exercises.     See Exercise, Manual, and Modality Logs for complete treatment.       Assessment/Plan  Kalpana still experiencing increased thoracic back  pain, especially R side. Pt tolerated manual and exercises well, just general fatigue. Pt would benefit from skilled PT to address Range of Motion  and Strength deficits, pain management and any concerns with ADLs.     Progress per Plan of Care           Timed:  Manual Therapy:    10     mins  21986;  Therapeutic Exercise:    20     mins  44173;     Neuromuscular Ann:        mins  78598;    Therapeutic Activity:          mins  87954;     Gait Training:           mins  36449;    Aquatic Therapy:          mins  06488;       Untimed:  Electrical Stimulation:         mins  99954 ( );  Mechanical Traction:         mins  06783;       Timed Treatment:   30   mins   Total Treatment:     30   mins      Electronically signed:   Loree Valenzuela PTA  Physical Therapist Assistant  [place] License #: [id number]

## 2021-12-14 ENCOUNTER — TREATMENT (OUTPATIENT)
Dept: PHYSICAL THERAPY | Facility: CLINIC | Age: 51
End: 2021-12-14

## 2021-12-14 ENCOUNTER — CLINICAL SUPPORT (OUTPATIENT)
Dept: FAMILY MEDICINE CLINIC | Facility: CLINIC | Age: 51
End: 2021-12-14

## 2021-12-14 DIAGNOSIS — E53.8 B12 DEFICIENCY: Primary | ICD-10-CM

## 2021-12-14 DIAGNOSIS — M54.6 THORACIC BACK PAIN, UNSPECIFIED BACK PAIN LATERALITY, UNSPECIFIED CHRONICITY: Primary | ICD-10-CM

## 2021-12-14 PROCEDURE — 97140 MANUAL THERAPY 1/> REGIONS: CPT | Performed by: PHYSICAL THERAPIST

## 2021-12-14 PROCEDURE — 97110 THERAPEUTIC EXERCISES: CPT | Performed by: PHYSICAL THERAPIST

## 2021-12-14 PROCEDURE — 96372 THER/PROPH/DIAG INJ SC/IM: CPT | Performed by: FAMILY MEDICINE

## 2021-12-14 RX ADMIN — CYANOCOBALAMIN 1000 MCG: 1000 INJECTION, SOLUTION INTRAMUSCULAR; SUBCUTANEOUS at 09:02

## 2021-12-14 NOTE — PROGRESS NOTES
Physical Therapy Daily Treatment Note      Patient: Kalpana Marquez   : 1970  Referring practitioner: Go Lucio MD  Date of Initial Visit: Type: THERAPY  Noted: 2021  Today's Date: 2021  Patient seen for 3 sessions           Subjective Questionnaire:       Subjective Evaluation    History of Present Illness    Subjective comment: Pt reports R scapular and thoracic pain with writing, cooking etc.  and states pain is 8/10.       Objective   See Exercise, Manual, and Modality Logs for complete treatment.       Assessment & Plan     Assessment    Assessment details: Pt tolerated tx well.  Pt reported 6/10 after tx.        Visit Diagnoses:    ICD-10-CM ICD-9-CM   1. Thoracic back pain, unspecified back pain laterality, unspecified chronicity  M54.6 724.1       Progress per Plan of Care and Progress strengthening /stabilization /functional activity           Timed:  Manual Therapy:    10     mins  88843;  Therapeutic Exercise:    18     mins  38088;     Neuromuscular Ann:        mins  58956;    Therapeutic Activity:          mins  10018;     Gait Training:           mins  59472;     Ultrasound:          mins  48693;    Electrical Stimulation:         mins  71718 ( );  Aquatic Therapy          mins  73438    Untimed:  Electrical Stimulation:         mins  92106 ( );  Mechanical Traction:         mins  07992;     Timed Treatment:   28   mins   Total Treatment:     28   mins    Electronically signed    Tia Arana PTA  Physical Therapist Assistant    [employer] license: [id number]

## 2021-12-17 ENCOUNTER — OFFICE VISIT (OUTPATIENT)
Dept: FAMILY MEDICINE CLINIC | Facility: CLINIC | Age: 51
End: 2021-12-17

## 2021-12-17 VITALS
HEIGHT: 61 IN | SYSTOLIC BLOOD PRESSURE: 120 MMHG | WEIGHT: 159 LBS | OXYGEN SATURATION: 98 % | TEMPERATURE: 98.7 F | RESPIRATION RATE: 18 BRPM | BODY MASS INDEX: 30.02 KG/M2 | DIASTOLIC BLOOD PRESSURE: 74 MMHG | HEART RATE: 60 BPM

## 2021-12-17 DIAGNOSIS — M54.6 ACUTE MIDLINE THORACIC BACK PAIN: ICD-10-CM

## 2021-12-17 DIAGNOSIS — M54.41 RIGHT-SIDED LOW BACK PAIN WITH RIGHT-SIDED SCIATICA, UNSPECIFIED CHRONICITY: ICD-10-CM

## 2021-12-17 DIAGNOSIS — R68.89 CONGESTION OF THROAT: ICD-10-CM

## 2021-12-17 DIAGNOSIS — M54.50 LUMBAR BACK PAIN: Primary | ICD-10-CM

## 2021-12-17 PROCEDURE — 99213 OFFICE O/P EST LOW 20 MIN: CPT | Performed by: STUDENT IN AN ORGANIZED HEALTH CARE EDUCATION/TRAINING PROGRAM

## 2021-12-17 RX ORDER — BENZONATATE 100 MG/1
100 CAPSULE ORAL 3 TIMES DAILY PRN
Qty: 30 CAPSULE | Refills: 0 | Status: SHIPPED | OUTPATIENT
Start: 2021-12-17 | End: 2022-08-23

## 2021-12-17 RX ORDER — POLYETHYLENE GLYCOL 3350 17 G/17G
17 POWDER, FOR SOLUTION ORAL DAILY
Qty: 30 PACKET | Refills: 0 | Status: SHIPPED | OUTPATIENT
Start: 2021-12-17 | End: 2022-01-16
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
## 2021-12-22 ENCOUNTER — TREATMENT (OUTPATIENT)
Dept: PHYSICAL THERAPY | Facility: CLINIC | Age: 51
End: 2021-12-22

## 2021-12-22 DIAGNOSIS — M54.6 THORACIC BACK PAIN, UNSPECIFIED BACK PAIN LATERALITY, UNSPECIFIED CHRONICITY: Primary | ICD-10-CM

## 2021-12-22 PROCEDURE — 97140 MANUAL THERAPY 1/> REGIONS: CPT | Performed by: PHYSICAL THERAPIST

## 2021-12-22 PROCEDURE — 97110 THERAPEUTIC EXERCISES: CPT | Performed by: PHYSICAL THERAPIST

## 2021-12-22 NOTE — PROGRESS NOTES
Physical Therapy Daily Progress Note        Patient: Kalpana Marquez   : 1970  Diagnosis/ICD-10 Code:  Thoracic back pain, unspecified back pain laterality, unspecified chronicity [M54.6]  Referring practitioner: Go Lucio MD  Date of Initial Visit: Type: THERAPY  Noted: 2021  Today's Date: 2021  Patient seen for 4 sessions             Subjective   Kalpana Marquez reports: still having quite a bit of pain in the upper back, states 7/10 pain.     Objective   General fatigue with strengthening exercises.     See Exercise, Manual, and Modality Logs for complete treatment.       Assessment/Plan  Kalpana still experiencing increased thoracic spine pain, especially on the R side. Pt tolerated exercises well, general fatigue with strengthening exercises. Pt would benefit from skilled PT to address Range of Motion  and Strength deficits, pain management and any concerns with ADLs.     Progress per Plan of Care           Timed:  Manual Therapy:    10     mins  83270;  Therapeutic Exercise:    20     mins  95766;     Neuromuscular Ann:        mins  71240;    Therapeutic Activity:          mins  53925;     Gait Training:           mins  06741;    Aquatic Therapy:          mins  97022;       Untimed:  Electrical Stimulation:         mins  47367 ( );  Mechanical Traction:         mins  12264;       Timed Treatment:   30   mins   Total Treatment:     30   mins      Electronically signed:   Loree Valenzuela PTA  Physical Therapist Assistant  [place] License #: [id number]

## 2021-12-23 ENCOUNTER — TREATMENT (OUTPATIENT)
Dept: PHYSICAL THERAPY | Facility: CLINIC | Age: 51
End: 2021-12-23

## 2021-12-23 DIAGNOSIS — M54.6 THORACIC BACK PAIN, UNSPECIFIED BACK PAIN LATERALITY, UNSPECIFIED CHRONICITY: Primary | ICD-10-CM

## 2021-12-23 PROCEDURE — 97110 THERAPEUTIC EXERCISES: CPT | Performed by: PHYSICAL THERAPIST

## 2021-12-23 PROCEDURE — 97140 MANUAL THERAPY 1/> REGIONS: CPT | Performed by: PHYSICAL THERAPIST

## 2021-12-23 NOTE — PROGRESS NOTES
Physical Therapy Progress Note      Patient: Kalpana Marquez   : 1970  Referring practitioner: Go Lucio MD  Date of Initial Visit: Type: THERAPY  Noted: 2021  Today's Date: 2021  Patient seen for 5 sessions           Subjective   Kalpana Marquez reports: pain -7/10.  Patient states that she is getting better with therapy.  Subjective Questionnaire: QuickDASH: 30=40-59% limitation      Objective          Palpation     Right   Hypertonic in the upper trapezius. Tenderness of the levator scapulae, lower trapezius, middle trapezius, rhomboids and upper trapezius.   Trigger point to levator scapulae and middle trapezius.     Tenderness   Cervical Spine   Tenderness in the right scapula.     Neurological Testing     Sensation   Cervical/Thoracic   Left   Intact: light touch    Right   Intact: light touch    Strength/Myotome Testing     Left Shoulder     Planes of Motion   Flexion: 4+   Extension: 4+   Abduction: 4+     Isolated Muscles   Lower trapezius: 4   Middle trapezius: 4     Right Shoulder     Planes of Motion   Flexion: 4+   Extension: 4+   Abduction: 4+     Isolated Muscles   Lower trapezius: 4   Middle trapezius: 4     Cervical Flexibility Comments:   Noted upper trap and levator scapula tightness      See Exercise, Manual, and Modality Logs for complete treatment.       Assessment & Plan     Assessment  Impairments: activity intolerance, impaired physical strength, lacks appropriate home exercise program and pain with function    Assessment details: Pt presents with limitations, noted by evaluation that impede patient's ability to tolerate functional activity with right upper extremity.  The skills of a therapist will be required to safely and effectively implement the following treatment plan to restore maximal level of function.    Prognosis: good    Goals  Plan Goals: 1. Carrying, Moving, and Handling Objects Functional Limitation     LTG 1: 8 weeks:  The patient will demonstrate 1-19%  limitation by achieving a score of 18 on the Quick DASH.   STATUS:  ongoing   STG 1a: 4 weeks:  The patient will demonstrate 20-39% limitation by achieving a score of 27 on the Quick DASH.     STATUS:  progressing      2. The patient has limited strength of the scapular mm  (mid and lower trap)  LTG 2: 8 weeks:  The patient will demonstrate 4+/5 strength for bilateral scapular strength in order to demonstrate improved posture/decrease pain.   STATUS: progressing   STG 2a: 4 weeks:  The patient will demonstrate 4/5 strength for scapular strength  STATUS:  Met  STG2b:  2 weeks:  The patient will be independent with home exercises.    STATUS:  ongoing     3. The patient complains of pain to the right scapula/mid back.   LTG 3: 8 weeks:  The patient will report a pain rating of 2/10 or better in order to improve sleep quality and tolerance to performance of activities of daily living.   STATUS:  ongoing  STG 3a: 4 weeks:  The patient will report a pain rating of 4/10 or better.    STATUS:  ongoing     Plan  Therapy options: will be seen for skilled therapy services  Planned modality interventions: cryotherapy, TENS and thermotherapy (hydrocollator packs)  Planned therapy interventions: manual therapy, soft tissue mobilization, postural training, therapeutic activities, stretching, strengthening, spinal/joint mobilization, functional ROM exercises, home exercise program and flexibility  Frequency: 2x week  Duration in weeks: 4  Treatment plan discussed with: patient        Visit Diagnoses:    ICD-10-CM ICD-9-CM   1. Thoracic back pain, unspecified back pain laterality, unspecified chronicity  M54.6 724.1       Progress per Plan of Care           Timed:  Manual Therapy:    13     mins  97183;  Therapeutic Exercise:    10     mins  23012;     Neuromuscular Ann:        mins  10781;    Therapeutic Activity:          mins  55653;     Gait Training:           mins  52153;     Ultrasound:          mins  74256;    Electrical  Stimulation:         mins  93886 ( );    Untimed:  Electrical Stimulation:         mins  26855 ( );  Mechanical Traction:         mins  98719;     Timed Treatment:   23   mins   Total Treatment:     23   mins  Candy Frost PT    Electronically singed 12/23/2021      [place] PT license: [id number]  Physical Therapist

## 2021-12-27 ENCOUNTER — TREATMENT (OUTPATIENT)
Dept: PHYSICAL THERAPY | Facility: CLINIC | Age: 51
End: 2021-12-27

## 2021-12-27 DIAGNOSIS — M54.6 THORACIC BACK PAIN, UNSPECIFIED BACK PAIN LATERALITY, UNSPECIFIED CHRONICITY: Primary | ICD-10-CM

## 2021-12-27 PROCEDURE — 97140 MANUAL THERAPY 1/> REGIONS: CPT | Performed by: PHYSICAL THERAPIST

## 2021-12-27 PROCEDURE — 97110 THERAPEUTIC EXERCISES: CPT | Performed by: PHYSICAL THERAPIST

## 2021-12-27 NOTE — PROGRESS NOTES
Physical Therapy Daily Treatment Note    Patient: Kalpana Marquez   : 1970  Referring practitioner: Go Lucio MD  Date of Initial Visit: Type: THERAPY  Noted: 2021  Today's Date: 2021  Patient seen for 6 sessions         Subjective   Kalpana Marquez reports: She is very sore today after doing yard work.    Objective          Ambulation     Observational Gait   Walking speed within functional limits.       See Exercise, Manual, and Modality Logs for complete treatment.     Assessment & Plan     Assessment    Assessment details: Pt tolerated session well overall. Some exercises were progressed as tolerated.    Plan  Plan details: Continue with POC.      Visit Diagnoses:    ICD-10-CM ICD-9-CM   1. Thoracic back pain, unspecified back pain laterality, unspecified chronicity  M54.6 724.1     Progress per Plan of Care         Timed:  Manual Therapy:    8     mins  01857;  Therapeutic Exercise:    20     mins  40937;     Neuromuscular Ann:    0    mins  85773;    Therapeutic Activity:     0     mins  80533;     Gait Trainin     mins  38765;     Aquatic Therapy     0     mins  79435;     Ultrasound:     0     mins  68236;    Electrical Stimulation:    0     mins  76650 ( );    Untimed:  Electrical Stimulation:    0     mins  58456 ( );  Mechanical Traction:    0     mins  92530;     Timed Treatment:   28   mins   Total Treatment:     28   mins  Ana Young PT    [place] License: [id number]

## 2021-12-29 ENCOUNTER — TREATMENT (OUTPATIENT)
Dept: PHYSICAL THERAPY | Facility: CLINIC | Age: 51
End: 2021-12-29

## 2021-12-29 DIAGNOSIS — M54.6 THORACIC BACK PAIN, UNSPECIFIED BACK PAIN LATERALITY, UNSPECIFIED CHRONICITY: Primary | ICD-10-CM

## 2021-12-29 PROCEDURE — 97110 THERAPEUTIC EXERCISES: CPT | Performed by: PHYSICAL THERAPIST

## 2021-12-29 PROCEDURE — 97140 MANUAL THERAPY 1/> REGIONS: CPT | Performed by: PHYSICAL THERAPIST

## 2021-12-29 NOTE — PROGRESS NOTES
Physical Therapy Daily Progress Note        Patient: Kalpana Marquez   : 1970  Diagnosis/ICD-10 Code:  Thoracic back pain, unspecified back pain laterality, unspecified chronicity [M54.6]  Referring practitioner: Go Lucio MD  Date of Initial Visit: Type: THERAPY  Noted: 2021  Today's Date: 2021  Patient seen for 7 sessions             Subjective   Kalpana Marquez reports: feeling a little better today.     Objective   General fatigue with UBE.     See Exercise, Manual, and Modality Logs for complete treatment.       Assessment/Plan  Kalpana progressing as evident by decreased overall back  pain. Pt tolerated exercises and manual well, just general fatigue. Pt would benefit from skilled PT to address Range of Motion  and Strength deficits, pain management and any concerns with ADLs.       Progress per Plan of Care           Timed:  Manual Therapy:    10     mins  61105;  Therapeutic Exercise:    20     mins  86436;     Neuromuscular Ann:        mins  26353;    Therapeutic Activity:          mins  60380;     Gait Training:           mins  20516;    Aquatic Therapy:          mins  40694;       Untimed:  Electrical Stimulation:         mins  80833 ( );  Mechanical Traction:         mins  80658;       Timed Treatment:   30   mins   Total Treatment:     30   mins      Electronically signed:   Loree Valenzuela PTA  Physical Therapist Assistant  [facility] License #: [id number]

## 2022-01-04 ENCOUNTER — TREATMENT (OUTPATIENT)
Dept: PHYSICAL THERAPY | Facility: CLINIC | Age: 52
End: 2022-01-04

## 2022-01-04 DIAGNOSIS — M54.6 THORACIC BACK PAIN, UNSPECIFIED BACK PAIN LATERALITY, UNSPECIFIED CHRONICITY: Primary | ICD-10-CM

## 2022-01-04 PROCEDURE — 97110 THERAPEUTIC EXERCISES: CPT | Performed by: PHYSICAL THERAPIST

## 2022-01-04 PROCEDURE — 97140 MANUAL THERAPY 1/> REGIONS: CPT | Performed by: PHYSICAL THERAPIST

## 2022-01-04 NOTE — PROGRESS NOTES
Physical Therapy Daily Progress Note        Patient: Kalpana Marquez   : 1970  Diagnosis/ICD-10 Code:  Thoracic back pain, unspecified back pain laterality, unspecified chronicity [M54.6]  Referring practitioner: Go Lucio MD  Date of Initial Visit: Type: THERAPY  Noted: 2021  Today's Date: 2022  Patient seen for 8 sessions             Subjective   Kalpana Marquez reports: still having some pain in her back.     Objective   Increased fatigue with strengthening exercises.     See Exercise, Manual, and Modality Logs for complete treatment.       Assessment/Plan  Kalpana still experiencing increased thoracic neck pain, especially on the right side. Pt tolerated exercises well, just increased fatigue with strengthening exercises. Pt would benefit from skilled PT to address Range of Motion  and Strength deficits, pain management and any concerns with ADLs.     Progress per Plan of Care           Timed:  Manual Therapy:    10     mins  27080;  Therapeutic Exercise:    20     mins  38995;     Neuromuscular Ann:        mins  04337;    Therapeutic Activity:          mins  03382;     Gait Training:           mins  67333;    Aquatic Therapy:          mins  62225;       Untimed:  Electrical Stimulation:         mins  46178 ( );  Mechanical Traction:         mins  29055;       Timed Treatment:   30   mins   Total Treatment:     30   mins      Electronically signed:   Loree Valenzuela PTA  Physical Therapist Assistant  [facility] License #: [id number]

## 2022-01-06 ENCOUNTER — TREATMENT (OUTPATIENT)
Dept: PHYSICAL THERAPY | Facility: CLINIC | Age: 52
End: 2022-01-06

## 2022-01-06 DIAGNOSIS — M54.6 THORACIC BACK PAIN, UNSPECIFIED BACK PAIN LATERALITY, UNSPECIFIED CHRONICITY: Primary | ICD-10-CM

## 2022-01-06 PROCEDURE — 97110 THERAPEUTIC EXERCISES: CPT | Performed by: PHYSICAL THERAPIST

## 2022-01-06 PROCEDURE — 97140 MANUAL THERAPY 1/> REGIONS: CPT | Performed by: PHYSICAL THERAPIST

## 2022-01-06 NOTE — PROGRESS NOTES
Physical Therapy Daily Treatment Note      Patient: Kalpana Marquez   : 1970  Referring practitioner: Go Lucio MD  Date of Initial Visit: Type: THERAPY  Noted: 2021  Today's Date: 2022  Patient seen for 9 sessions           Subjective Questionnaire:       Subjective Evaluation    History of Present Illness    Subjective comment: Pt reports her back is better however still has pain in R shoulder upper trap and thoracic spine.       Objective   See Exercise, Manual, and Modality Logs for complete treatment.       Assessment & Plan     Assessment    Assessment details: Pt was unable to dictate what has gotten better.  Pt tolerated tx well.        Visit Diagnoses:    ICD-10-CM ICD-9-CM   1. Thoracic back pain, unspecified back pain laterality, unspecified chronicity  M54.6 724.1       Progress per Plan of Care and Progress strengthening /stabilization /functional activity           Timed:  Manual Therapy:    12     mins  08927;  Therapeutic Exercise:    16     mins  92991;     Neuromuscular Ann:        mins  44517;    Therapeutic Activity:          mins  38077;     Gait Training:           mins  62603;     Ultrasound:          mins  55241;    Electrical Stimulation:         mins  10686 ( );  Aquatic Therapy          mins  28255    Untimed:  Electrical Stimulation:         mins  43274 ( );  Mechanical Traction:         mins  43536;     Timed Treatment:   28   mins   Total Treatment:     28   mins    Electronically signed    Tia Arana PTA  Physical Therapist Assistant    [employer] license: [id number]

## 2022-01-07 ENCOUNTER — HOSPITAL ENCOUNTER (OUTPATIENT)
Dept: MRI IMAGING | Facility: HOSPITAL | Age: 52
Discharge: HOME | End: 2022-01-07

## 2022-01-07 DIAGNOSIS — M54.50 LUMBAR BACK PAIN: ICD-10-CM

## 2022-01-07 DIAGNOSIS — M54.6 ACUTE MIDLINE THORACIC BACK PAIN: ICD-10-CM

## 2022-01-07 DIAGNOSIS — M54.41 RIGHT-SIDED LOW BACK PAIN WITH RIGHT-SIDED SCIATICA, UNSPECIFIED CHRONICITY: ICD-10-CM

## 2022-01-07 PROCEDURE — 72146 MRI CHEST SPINE W/O DYE: CPT

## 2022-01-07 PROCEDURE — 72148 MRI LUMBAR SPINE W/O DYE: CPT

## 2022-01-31 ENCOUNTER — TELEPHONE (OUTPATIENT)
Dept: FAMILY MEDICINE CLINIC | Facility: CLINIC | Age: 52
End: 2022-01-31

## 2022-01-31 DIAGNOSIS — Z12.31 ENCOUNTER FOR SCREENING MAMMOGRAM FOR MALIGNANT NEOPLASM OF BREAST: Primary | ICD-10-CM

## 2022-02-03 DIAGNOSIS — I10 HYPERTENSION, UNSPECIFIED TYPE: ICD-10-CM

## 2022-02-03 RX ORDER — AMLODIPINE BESYLATE 10 MG/1
TABLET ORAL
Qty: 90 TABLET | Refills: 1 | Status: SHIPPED | OUTPATIENT
Start: 2022-02-03 | End: 2022-09-29 | Stop reason: SDUPTHER

## 2022-03-01 ENCOUNTER — DOCUMENTATION (OUTPATIENT)
Dept: PHYSICAL THERAPY | Facility: CLINIC | Age: 52
End: 2022-03-01

## 2022-03-22 ENCOUNTER — HOSPITAL ENCOUNTER (OUTPATIENT)
Dept: MAMMOGRAPHY | Facility: HOSPITAL | Age: 52
Discharge: HOME | End: 2022-03-22
Admitting: FAMILY MEDICINE

## 2022-03-22 DIAGNOSIS — Z12.31 ENCOUNTER FOR SCREENING MAMMOGRAM FOR MALIGNANT NEOPLASM OF BREAST: ICD-10-CM

## 2022-03-22 PROCEDURE — 77063 BREAST TOMOSYNTHESIS BI: CPT

## 2022-03-22 PROCEDURE — 77067 SCR MAMMO BI INCL CAD: CPT

## 2022-05-17 ENCOUNTER — HOSPITAL ENCOUNTER (OUTPATIENT)
Dept: GENERAL RADIOLOGY | Facility: HOSPITAL | Age: 52
Discharge: HOME | End: 2022-05-17

## 2022-05-17 ENCOUNTER — OFFICE VISIT (OUTPATIENT)
Dept: FAMILY MEDICINE CLINIC | Facility: CLINIC | Age: 52
End: 2022-05-17

## 2022-05-17 VITALS
BODY MASS INDEX: 30.53 KG/M2 | TEMPERATURE: 98 F | WEIGHT: 161.7 LBS | HEIGHT: 61 IN | SYSTOLIC BLOOD PRESSURE: 146 MMHG | RESPIRATION RATE: 18 BRPM | DIASTOLIC BLOOD PRESSURE: 90 MMHG | OXYGEN SATURATION: 97 % | HEART RATE: 92 BPM

## 2022-05-17 DIAGNOSIS — M54.2 NECK PAIN: ICD-10-CM

## 2022-05-17 DIAGNOSIS — R53.83 FATIGUE, UNSPECIFIED TYPE: ICD-10-CM

## 2022-05-17 DIAGNOSIS — Z13.220 SCREENING FOR LIPID DISORDERS: ICD-10-CM

## 2022-05-17 DIAGNOSIS — M25.551 RIGHT HIP PAIN: ICD-10-CM

## 2022-05-17 DIAGNOSIS — Z11.59 ENCOUNTER FOR HEPATITIS C SCREENING TEST FOR LOW RISK PATIENT: ICD-10-CM

## 2022-05-17 DIAGNOSIS — G47.19 EXCESSIVE DAYTIME SLEEPINESS: ICD-10-CM

## 2022-05-17 DIAGNOSIS — E55.9 VITAMIN D DEFICIENCY: ICD-10-CM

## 2022-05-17 DIAGNOSIS — M19.90 ARTHRITIS: ICD-10-CM

## 2022-05-17 DIAGNOSIS — M25.511 ACUTE PAIN OF RIGHT SHOULDER: Primary | ICD-10-CM

## 2022-05-17 DIAGNOSIS — Z01.89 ROUTINE LAB DRAW: ICD-10-CM

## 2022-05-17 DIAGNOSIS — I10 PRIMARY HYPERTENSION: ICD-10-CM

## 2022-05-17 DIAGNOSIS — M25.511 ACUTE PAIN OF RIGHT SHOULDER: ICD-10-CM

## 2022-05-17 PROCEDURE — 72050 X-RAY EXAM NECK SPINE 4/5VWS: CPT

## 2022-05-17 PROCEDURE — 99214 OFFICE O/P EST MOD 30 MIN: CPT

## 2022-05-17 PROCEDURE — 73030 X-RAY EXAM OF SHOULDER: CPT

## 2022-05-17 PROCEDURE — 73502 X-RAY EXAM HIP UNI 2-3 VIEWS: CPT

## 2022-05-17 NOTE — PROGRESS NOTES
Kalpana Marquez presents to [facility] with complaints of right shoulder pain, right hip pain, and neck pain, patient is also here to transfer care.      History of Present Illness  This is a 51-year-old female, past medical history significant for hypertension, degenerative disc disease, vitamin D deficiency, and generalized arthritis, who presents to clinic with complaints of right shoulder pain, right hip pain, and neck pain.  Patient also has complaints of difficulty with being extremely fatigued and tired all the time.    Hypertension: Patient states has been on same medication for about 20 years, has been doing well.  No acute issues.  Currently takes amlodipine 10 mg, denies any chest pain/shortness of breath.    Vitamin D deficiency: Patient has been taking supplement for some time, doing well.  Has not had a lab all repeated in some time, is okay with us ordering that today.    Generalized arthritis: Patient states that she does have generalized arthritis, states that it does give her fits at times, specifically in her knees and her hips.  She does have diclofenac that she can take as needed, has also seen a foot doctor, but states that she does try to manage this without medication.  No acute issues with this at this time.    DDD: Did see pain management the past, states that she did not like how they did things, did go through therapy, but states she tries to manage this on her own.  No acute issues at this time.    Right shoulder pain/right hip pain/neck pain: Patient states that she has had issues with her neck pain in her right shoulder in the past, states that she went to physical therapy, it was improving, but it is back again.  She is unsure if she reinjured it, does not remember injuring, but states that it is very painful.  Patient states that the pain will start in her neck, will go all the way down to her shoulder, and will go other than her fingertips.  She does  admit to some numbness and tingling associated with this.  She has not had imaging done of her upper neck, or her shoulder.  She has had imaging done of her lower back and her mid back.  She states that she has not seen pain management, does not like to take medications, is also does not like anything that would make her sleepy.  She is okay with us ordering imaging to further evaluate.  She also admits to some right hip pain, states that it is quite difficult for her to sit on that side, or lay on that side, and states that she is not had the imaging that is well.  She denies any numbness or tingling on that part though.  She has tried physical therapy in the past for different things, did never really complete the full course, as she would improve and then symptoms did return.    Patient also admits to being extremely sleepy during the day, did have a sleep study done in the past, it did not have any abnormalities.  Patient states she has no issues falling asleep or staying asleep, states that she does sleep 7 to 8 hours a night, but states that she is extremely drowsy during the day.  She has had labs checked, her vitamins were within normal limits, but has never had an iron panel checked.  She states that even just while sitting here talking today that her eyes feel really heavy, and that she can just go to sleep.  She is unsure why she has such a difficult time.    We will discuss other preventative screenings at next visit.    Past Medical History:   Diagnosis Date   • Allergic rhinitis    • Arthritis    • Constipation    • Depression    • Foot pain    • Heel pain    • Hypertension    • Limb swelling    • Numbness in feet    • Seasonal allergies      Past Surgical History:   •  SECTION    x 6   • COLONOSCOPY   • COLONOSCOPY    Procedure: COLONOSCOPY;  Surgeon: Uche Nance MD;  Location: [facility];  Service: Gastroenterology;  Laterality: N/A;  normal colonoscopy   • ENDOSCOPY   •  "HYSTERECTOMY       Social History     Socioeconomic History   • Marital status:    Tobacco Use   • Smoking status: Former Smoker     Packs/day: 0.50     Years: 25.00     Pack years: 12.50     Quit date: 8/3/2021     Years since quittin.7   • Smokeless tobacco: Never Used   • Tobacco comment: smoked 6-10 years, smokes hokah   Vaping Use   • Vaping Use: Never used   Substance and Sexual Activity   • Alcohol use: Never   • Drug use: Never     Comment: Current Some day     Socioeconomic History   • Marital status:    Tobacco Use   • Smoking status: Former Smoker     Packs/day: 0.50     Years: 25.00     Pack years: 12.50     Quit date: 8/3/2021     Years since quittin.7   • Smokeless tobacco: Never Used   • Tobacco comment: smoked 6-10 years, smokes hokah   Vaping Use   • Vaping Use: Never used   Substance and Sexual Activity   • Alcohol use: Never   • Drug use: Never     Comment: Current Some day      Problem Relation Age of Onset   • Diabetes Mother    • Arthritis Mother    • Stroke Father    • Cancer Father    • Diabetes Sister    • Malig Hyperthermia Neg Hx          Objective   Vital Signs:   /90   Pulse 92   Temp 98 °F (36.7 °C)   Resp 18   Ht 154.9 cm (60.98\")   Wt 73.3 kg (161 lb 11.2 oz)   SpO2 97%   BMI 30.57 kg/m²     Body mass index is 30.57 kg/m².    All labs, imaging, test results, and specialty provider notes reviewed with patient.       Physical Exam  Vitals reviewed.   Constitutional:       Appearance: Normal appearance.   Cardiovascular:      Rate and Rhythm: Normal rate and regular rhythm.      Pulses: Normal pulses.      Heart sounds: Normal heart sounds.   Pulmonary:      Effort: Pulmonary effort is normal.      Breath sounds: Normal breath sounds.   Neurological:      General: No focal deficit present.      Mental Status: She is alert and oriented to person, place, and time.               Assessment and Plan:  Diagnoses and all orders for this visit:    1. Acute " pain of right shoulder (Primary)  Comments:  Will obtain x-ray, can consider MRI if needed.  Can also consider physical therapy.  Orders:  -     XR Shoulder 2+ View Right; Future    2. Right hip pain  Comments:  Will obtain x-ray, can consider MRI if needed.  Can consider Ortho referral if needed.  Orders:  -     XR Hip With or Without Pelvis 2 - 3 View Right; Future    3. Neck pain  Comments:  We will obtain x-ray, can consider MRI if needed.  Orders:  -     XR Spine Cervical Complete 4 or 5 View; Future    4. Fatigue, unspecified type  Comments:  Will obtain labs to further evaluate, can consider sending back to sleep to rule out possible narcolepsy.  Orders:  -     TSH Rfx On Abnormal To Free T4; Future  -     Ferritin; Future  -     Iron Profile; Future  -     Vitamin B12; Future  -     Folate; Future    5. Excessive daytime sleepiness    6. Arthritis    7. Vitamin D deficiency  Comments:  Repeat level, continue supplement for now.  Orders:  -     Vitamin D 25 Hydroxy; Future    8. Primary hypertension  Comments:  Stable.  Continue amlodipine.  Orders:  -     Comprehensive Metabolic Panel; Future  -     CBC Auto Differential; Future  -     Urinalysis With Culture If Indicated -; Future    9. Routine lab draw    10. Screening for lipid disorders  -     Lipid Panel; Future    11. Encounter for hepatitis C screening test for low risk patient  -     Hepatitis C Antibody; Future          Follow Up:  Return in about 4 weeks (around 6/14/2022).    Patient was given instructions and counseling regarding her condition or for health maintenance advice. Please see specific information pulled into the AVS if appropriate.

## 2022-05-18 ENCOUNTER — LAB (OUTPATIENT)
Dept: LAB | Facility: HOSPITAL | Age: 52
End: 2022-05-18

## 2022-05-18 DIAGNOSIS — Z11.59 ENCOUNTER FOR HEPATITIS C SCREENING TEST FOR LOW RISK PATIENT: ICD-10-CM

## 2022-05-18 DIAGNOSIS — M25.511 CHRONIC RIGHT SHOULDER PAIN: ICD-10-CM

## 2022-05-18 DIAGNOSIS — I10 PRIMARY HYPERTENSION: ICD-10-CM

## 2022-05-18 DIAGNOSIS — M50.30 DDD (DEGENERATIVE DISC DISEASE), CERVICAL: Primary | ICD-10-CM

## 2022-05-18 DIAGNOSIS — Z13.220 SCREENING FOR LIPID DISORDERS: ICD-10-CM

## 2022-05-18 DIAGNOSIS — M25.551 RIGHT HIP PAIN: ICD-10-CM

## 2022-05-18 DIAGNOSIS — E55.9 VITAMIN D DEFICIENCY: ICD-10-CM

## 2022-05-18 DIAGNOSIS — G89.29 CHRONIC RIGHT SHOULDER PAIN: ICD-10-CM

## 2022-05-18 DIAGNOSIS — R53.83 FATIGUE, UNSPECIFIED TYPE: ICD-10-CM

## 2022-05-18 LAB
25(OH)D3 SERPL-MCNC: 46.6 NG/ML (ref 30–100)
ALBUMIN SERPL-MCNC: 4.3 G/DL (ref 3.5–5.2)
ALBUMIN/GLOB SERPL: 1.7 G/DL
ALP SERPL-CCNC: 68 U/L (ref 39–117)
ALT SERPL W P-5'-P-CCNC: 12 U/L (ref 1–33)
ANION GAP SERPL CALCULATED.3IONS-SCNC: 8.1 MMOL/L (ref 5–15)
AST SERPL-CCNC: 16 U/L (ref 1–32)
BASOPHILS # BLD AUTO: 0.03 10*3/MM3 (ref 0–0.2)
BASOPHILS NFR BLD AUTO: 0.6 % (ref 0–1.5)
BILIRUB SERPL-MCNC: 0.4 MG/DL (ref 0–1.2)
BILIRUB UR QL STRIP: NEGATIVE
BUN SERPL-MCNC: 18 MG/DL (ref 6–20)
BUN/CREAT SERPL: 20.5 (ref 7–25)
CALCIUM SPEC-SCNC: 9.8 MG/DL (ref 8.6–10.5)
CHLORIDE SERPL-SCNC: 103 MMOL/L (ref 98–107)
CHOLEST SERPL-MCNC: 190 MG/DL (ref 0–200)
CLARITY UR: CLEAR
CO2 SERPL-SCNC: 26.9 MMOL/L (ref 22–29)
COLOR UR: YELLOW
CREAT SERPL-MCNC: 0.88 MG/DL (ref 0.57–1)
DEPRECATED RDW RBC AUTO: 42.3 FL (ref 37–54)
EGFRCR SERPLBLD CKD-EPI 2021: 78.7 ML/MIN/1.73
EOSINOPHIL # BLD AUTO: 0.12 10*3/MM3 (ref 0–0.4)
EOSINOPHIL NFR BLD AUTO: 2.6 % (ref 0.3–6.2)
ERYTHROCYTE [DISTWIDTH] IN BLOOD BY AUTOMATED COUNT: 12.1 % (ref 12.3–15.4)
FERRITIN SERPL-MCNC: 221 NG/ML (ref 13–150)
FOLATE SERPL-MCNC: 12.5 NG/ML (ref 4.78–24.2)
GLOBULIN UR ELPH-MCNC: 2.6 GM/DL
GLUCOSE SERPL-MCNC: 109 MG/DL (ref 65–99)
GLUCOSE UR STRIP-MCNC: NEGATIVE MG/DL
HCT VFR BLD AUTO: 39.9 % (ref 34–46.6)
HCV AB SER DONR QL: NORMAL
HDLC SERPL-MCNC: 52 MG/DL (ref 40–60)
HGB BLD-MCNC: 13.5 G/DL (ref 12–15.9)
HGB UR QL STRIP.AUTO: NEGATIVE
IMM GRANULOCYTES # BLD AUTO: 0.01 10*3/MM3 (ref 0–0.05)
IMM GRANULOCYTES NFR BLD AUTO: 0.2 % (ref 0–0.5)
IRON 24H UR-MRATE: 85 MCG/DL (ref 37–145)
IRON SATN MFR SERPL: 25 % (ref 20–50)
KETONES UR QL STRIP: NEGATIVE
LDLC SERPL CALC-MCNC: 126 MG/DL (ref 0–100)
LDLC/HDLC SERPL: 2.41 {RATIO}
LEUKOCYTE ESTERASE UR QL STRIP.AUTO: NEGATIVE
LYMPHOCYTES # BLD AUTO: 1.44 10*3/MM3 (ref 0.7–3.1)
LYMPHOCYTES NFR BLD AUTO: 30.9 % (ref 19.6–45.3)
MCH RBC QN AUTO: 32.5 PG (ref 26.6–33)
MCHC RBC AUTO-ENTMCNC: 33.8 G/DL (ref 31.5–35.7)
MCV RBC AUTO: 96.1 FL (ref 79–97)
MONOCYTES # BLD AUTO: 0.42 10*3/MM3 (ref 0.1–0.9)
MONOCYTES NFR BLD AUTO: 9 % (ref 5–12)
NEUTROPHILS NFR BLD AUTO: 2.64 10*3/MM3 (ref 1.7–7)
NEUTROPHILS NFR BLD AUTO: 56.7 % (ref 42.7–76)
NITRITE UR QL STRIP: NEGATIVE
NRBC BLD AUTO-RTO: 0 /100 WBC (ref 0–0.2)
PH UR STRIP.AUTO: 6 [PH] (ref 5–8)
PLATELET # BLD AUTO: 237 10*3/MM3 (ref 140–450)
PMV BLD AUTO: 10.7 FL (ref 6–12)
POTASSIUM SERPL-SCNC: 3.8 MMOL/L (ref 3.5–5.2)
PROT SERPL-MCNC: 6.9 G/DL (ref 6–8.5)
PROT UR QL STRIP: NEGATIVE
RBC # BLD AUTO: 4.15 10*6/MM3 (ref 3.77–5.28)
SODIUM SERPL-SCNC: 138 MMOL/L (ref 136–145)
SP GR UR STRIP: 1.02 (ref 1–1.03)
TIBC SERPL-MCNC: 334 MCG/DL (ref 298–536)
TRANSFERRIN SERPL-MCNC: 224 MG/DL (ref 200–360)
TRIGL SERPL-MCNC: 64 MG/DL (ref 0–150)
TSH SERPL DL<=0.05 MIU/L-ACNC: 2.17 UIU/ML (ref 0.27–4.2)
UROBILINOGEN UR QL STRIP: NORMAL
VIT B12 BLD-MCNC: 392 PG/ML (ref 211–946)
VLDLC SERPL-MCNC: 12 MG/DL (ref 5–40)
WBC NRBC COR # BLD: 4.66 10*3/MM3 (ref 3.4–10.8)

## 2022-05-18 PROCEDURE — 85025 COMPLETE CBC W/AUTO DIFF WBC: CPT

## 2022-05-18 PROCEDURE — 84443 ASSAY THYROID STIM HORMONE: CPT

## 2022-05-18 PROCEDURE — 82306 VITAMIN D 25 HYDROXY: CPT

## 2022-05-18 PROCEDURE — 82607 VITAMIN B-12: CPT

## 2022-05-18 PROCEDURE — 83540 ASSAY OF IRON: CPT

## 2022-05-18 PROCEDURE — 80061 LIPID PANEL: CPT

## 2022-05-18 PROCEDURE — 36415 COLL VENOUS BLD VENIPUNCTURE: CPT

## 2022-05-18 PROCEDURE — 82728 ASSAY OF FERRITIN: CPT

## 2022-05-18 PROCEDURE — 86803 HEPATITIS C AB TEST: CPT

## 2022-05-18 PROCEDURE — 80053 COMPREHEN METABOLIC PANEL: CPT

## 2022-05-18 PROCEDURE — 82746 ASSAY OF FOLIC ACID SERUM: CPT

## 2022-05-18 PROCEDURE — 81003 URINALYSIS AUTO W/O SCOPE: CPT

## 2022-05-18 PROCEDURE — 84466 ASSAY OF TRANSFERRIN: CPT

## 2022-06-21 ENCOUNTER — OFFICE VISIT (OUTPATIENT)
Dept: FAMILY MEDICINE CLINIC | Facility: CLINIC | Age: 52
End: 2022-06-21

## 2022-06-21 VITALS
RESPIRATION RATE: 16 BRPM | TEMPERATURE: 98 F | DIASTOLIC BLOOD PRESSURE: 78 MMHG | BODY MASS INDEX: 31.21 KG/M2 | SYSTOLIC BLOOD PRESSURE: 114 MMHG | WEIGHT: 165.3 LBS | HEIGHT: 61 IN | OXYGEN SATURATION: 95 % | HEART RATE: 73 BPM

## 2022-06-21 DIAGNOSIS — G89.29 CHRONIC BILATERAL LOW BACK PAIN WITH RIGHT-SIDED SCIATICA: ICD-10-CM

## 2022-06-21 DIAGNOSIS — E55.9 VITAMIN D DEFICIENCY: ICD-10-CM

## 2022-06-21 DIAGNOSIS — M25.511 CHRONIC RIGHT SHOULDER PAIN: ICD-10-CM

## 2022-06-21 DIAGNOSIS — E66.09 CLASS 1 OBESITY DUE TO EXCESS CALORIES WITH SERIOUS COMORBIDITY AND BODY MASS INDEX (BMI) OF 31.0 TO 31.9 IN ADULT: Primary | ICD-10-CM

## 2022-06-21 DIAGNOSIS — Z51.81 ENCOUNTER FOR MEDICATION MONITORING: ICD-10-CM

## 2022-06-21 DIAGNOSIS — M54.41 CHRONIC BILATERAL LOW BACK PAIN WITH RIGHT-SIDED SCIATICA: ICD-10-CM

## 2022-06-21 DIAGNOSIS — R53.83 FATIGUE, UNSPECIFIED TYPE: ICD-10-CM

## 2022-06-21 DIAGNOSIS — E53.8 VITAMIN B12 DEFICIENCY: ICD-10-CM

## 2022-06-21 DIAGNOSIS — M54.2 NECK PAIN: ICD-10-CM

## 2022-06-21 DIAGNOSIS — G89.29 CHRONIC RIGHT SHOULDER PAIN: ICD-10-CM

## 2022-06-21 PROCEDURE — 99214 OFFICE O/P EST MOD 30 MIN: CPT

## 2022-06-21 PROCEDURE — 80305 DRUG TEST PRSMV DIR OPT OBS: CPT

## 2022-06-21 PROCEDURE — 96372 THER/PROPH/DIAG INJ SC/IM: CPT

## 2022-06-21 RX ORDER — CYANOCOBALAMIN 1000 UG/ML
1000 INJECTION, SOLUTION INTRAMUSCULAR; SUBCUTANEOUS
Status: SHIPPED | OUTPATIENT
Start: 2022-06-21

## 2022-06-21 RX ORDER — PHENTERMINE HYDROCHLORIDE 37.5 MG/1
37.5 CAPSULE ORAL EVERY MORNING
Qty: 30 CAPSULE | Refills: 0 | Status: SHIPPED | OUTPATIENT
Start: 2022-06-21 | End: 2022-07-19 | Stop reason: SDUPTHER

## 2022-06-21 RX ORDER — LORATADINE 10 MG/1
10 TABLET ORAL EVERY MORNING
COMMUNITY
Start: 2022-06-08 | End: 2022-10-11 | Stop reason: SDUPTHER

## 2022-06-21 RX ORDER — ERGOCALCIFEROL 1.25 MG/1
50000 CAPSULE ORAL
Qty: 12 CAPSULE | Refills: 1 | Status: SHIPPED | OUTPATIENT
Start: 2022-06-21 | End: 2022-10-11 | Stop reason: SDUPTHER

## 2022-06-21 RX ADMIN — CYANOCOBALAMIN 1000 MCG: 1000 INJECTION, SOLUTION INTRAMUSCULAR; SUBCUTANEOUS at 12:02

## 2022-06-21 NOTE — PROGRESS NOTES
Kalpana Marquez presents to [facility] with complaints of persistent right shoulder, right hip/thigh, and right forearm pain.  Patient is also here for about follow-up.      History of Present Illness  This is a 52-year-old female who presents to clinic for 1 month follow-up also with complaints of persistent right shoulder, right hip/thigh, and right forearm pain.    Patient recently had x-rays performed, no acute finding was noted in the right shoulder or the right hip.  There was some degenerative changes in the neck x-ray.  Patient states that she does still have persistent neck pain, states that she will take the diclofenac and or a cyclobenzaprine when pain becomes severe, but does not wish to be on these medications long-term.  Patient also states that she has an upcoming appointment with physical therapy, is hoping that they will be able to give her some relief.  She is also been going and having massages performed, and states that is helping some as well.  Patient does states she has persistent issues in the right hip, states that it really starts in her lower back, then the numbness and tingling and sharp pain will shoot down into her right foot.  Patient is wondering what could be causing this, but states it is quite measurable and does hurt her all the time.    Patient also has complaints of persistent issues with fatigue during the day, did review blood work which did not show any abnormalities, vitamin B12 was within normal limits but at the lower side of normal.  Patient was previously on vitamin B12, states that it did give her a boost in energy, is wondering if she can go back on this.  She also has an upcoming appointment with sleep medicine, has seen them prior, but is going to get back into them to see what she needs to do to help with the extreme fatigue she experiences during the day.    Patient also states that she is got an issue with weight, states that she has  "been on medication in the past to help with this, was at 1 time on phentermine, but this was several months ago.  She is wondering if she be placed back on this, states that it did give her a lot of results as far as weight loss.  She states she is very active, does walk about 5 miles per day, also tries to make sure her diet is well-balanced.  She states she knows some of it is hormonal, but is wondering if she can be placed back on the phentermine as it worked really well for her previously.    The following portions of the patient's history were personally reviewed and updated as appropriate: allergies, current medications, past medical history, past surgical history, past family history, and past social history.       Objective   Vital Signs:   /78   Pulse 73   Temp 98 °F (36.7 °C)   Resp 16   Ht 154.9 cm (60.98\")   Wt 75 kg (165 lb 4.8 oz)   SpO2 95%   BMI 31.25 kg/m²     Body mass index is 31.25 kg/m².    All labs, imaging, test results, and specialty provider notes reviewed with patient.     Physical Exam  Vitals reviewed.   Constitutional:       Appearance: Normal appearance.   Cardiovascular:      Rate and Rhythm: Normal rate and regular rhythm.      Pulses: Normal pulses.      Heart sounds: Normal heart sounds.   Pulmonary:      Effort: Pulmonary effort is normal.      Breath sounds: Normal breath sounds.   Neurological:      General: No focal deficit present.      Mental Status: She is alert and oriented to person, place, and time.          Assessment and Plan:  Diagnoses and all orders for this visit:    1. Class 1 obesity due to excess calories with serious comorbidity and body mass index (BMI) of 31.0 to 31.9 in adult (Primary)  Comments:  We will start patient on phentermine.  UDS/consent obtained, Mark reviewed.  We will follow-up in 1 month.  Orders:  -     phentermine 37.5 MG capsule; Take 1 capsule by mouth Every Morning.  Dispense: 30 capsule; Refill: 0    2. Encounter for " medication monitoring  -     POC Urine Drug Screen [facility]    3. Vitamin B12 deficiency  Comments:  Start back on vitamin B12 injections.  We will repeat level in future.  Orders:  -     cyanocobalamin injection 1,000 mcg    4. Fatigue, unspecified type  Comments:  Patient to get back in with sleep medicine, concern for possible narcolepsy.  May benefit from stimulant long-term.  Orders:  -     cyanocobalamin injection 1,000 mcg    5. Vitamin D deficiency  Comments:  We will refill medication.  Needs to remain on supplement per last blood work.  Orders:  -     vitamin D (ERGOCALCIFEROL) 1.25 MG (61416 UT) capsule capsule; Take 1 capsule by mouth Every 7 (Seven) Days.  Dispense: 12 capsule; Refill: 1    6. Chronic right shoulder pain    7. Neck pain  Comments:  We will try physical therapy, can consider pain management referral if needed.  Continue as needed diclofenac.    8. Chronic bilateral low back pain with right-sided sciatica          Follow Up:  Return in about 4 weeks (around 7/19/2022).    Patient was given instructions and counseling regarding her condition or for health maintenance advice. Please see specific information pulled into the AVS if appropriate.

## 2022-06-23 ENCOUNTER — TREATMENT (OUTPATIENT)
Dept: PHYSICAL THERAPY | Facility: CLINIC | Age: 52
End: 2022-06-23

## 2022-06-23 DIAGNOSIS — M50.30 DDD (DEGENERATIVE DISC DISEASE), CERVICAL: Primary | ICD-10-CM

## 2022-06-23 DIAGNOSIS — M25.511 RIGHT SHOULDER PAIN, UNSPECIFIED CHRONICITY: ICD-10-CM

## 2022-06-23 DIAGNOSIS — M25.551 RIGHT HIP PAIN: ICD-10-CM

## 2022-06-23 PROCEDURE — 97161 PT EVAL LOW COMPLEX 20 MIN: CPT | Performed by: PHYSICAL THERAPIST

## 2022-06-23 NOTE — PROGRESS NOTES
Physical Therapy Initial Evaluation and Plan of Care    Patient: Kalpana Marquez   : 1970  Diagnosis/ICD-10 Code:  DDD (degenerative disc disease), cervical [M50.30]  Referring practitioner: MARY Means  Date of Initial Visit: 2022  Today's Date: 2022  Patient seen for 1 sessions           Subjective Questionnaire: QuickDASH: 45/55 = 77.27% Disability      Subjective Evaluation    History of Present Illness  Mechanism of injury: The patient presents to physical therapy with complaints of right sided neck pain that radiates into her hand and right hip pain that radiates down to her foot. Her pain has been present for a while, but worsened a few months ago without a specific JESÚS. Her right arm pain is increased with cervical ROM, writing, and holding her phone. Her pain is improved with stretching and OTC medication. She denies any numbness/tingling into her right hand. She has noticed that she has difficulty writing and gripping things due to her right arm pain.     Her hip pain is located posterolaterally in her right hip and radiates into her foot. Her pain is increased primarily with sitting. Her pain is improved with standing/walking.    Pain  Current pain ratin  At best pain ratin  At worst pain rating: 10    Hand dominance: right             Objective          Tenderness     Additional Tenderness Details  Tenderness in right cervical paraspinals, UT, wrist extensors.  Tenderness in right piriformis    Neurological Testing     Additional Neurological Details  Sensation to light touch intact and equal bilaterally except for hypersensation on right in C4 and C7  Seated slump: (+) on right for increased sciatic neural tension , (-) on left    Supine passive SLR: (-) bilaterally for increased sciatic neural tension.    Active Range of Motion   Cervical/Thoracic Spine   Cervical    Flexion: 50 degrees   Extension: 40 degrees with pain  Left lateral flexion: 22 degrees with  pain  Right lateral flexion: 20 degrees with pain  Left rotation: 75 (pain on right) degrees   Right rotation: 65 degrees     Additional Active Range of Motion Details  Bilateral shoulder AROM  Grossly WFL  Lumbar AROM grossly WFL    Strength/Myotome Testing     Left Shoulder     Planes of Motion   Flexion: 4+   Abduction: 4+   External rotation at 0°: 5   Internal rotation at 0°: 5     Right Shoulder     Planes of Motion   Flexion: 4   Abduction: 4   External rotation at 0°: 5   Internal rotation at 0°: 5     Left Elbow   Flexion: 5    Right Elbow   Flexion: 5    Left Wrist/Hand   Wrist extension: 5     (2nd hand position)     Trial 1: 45 lbs    Trial 2: 42 lbs    Trial 3: 47 lbs    Average: 44.67 lbs    Right Wrist/Hand   Wrist extension: 4+     (2nd hand position)     Trial 1: 38 lbs    Trial 2: 42 lbs    Trial 3: 40 lbs    Average: 40 lbs    Left Hip   Planes of Motion   Flexion: 4  Extension: 4-    Right Hip   Planes of Motion   Flexion: 4  Extension: 4-    Left Knee   Flexion: 5  Extension: 5    Right Knee   Flexion: 5  Extension: 5    Left Ankle/Foot   Dorsiflexion: 5    Right Ankle/Foot   Dorsiflexion: 5    Tests     Right Wrist/Hand   Negative Phalen's sign and Tinel's sign (medial nerve).       See Exercise, Manual, and Modality Logs for complete treatment.     Assessment & Plan     Assessment  Impairments: abnormal muscle firing, abnormal muscle tone, abnormal or restricted ROM, activity intolerance, impaired physical strength, lacks appropriate home exercise program, pain with function and safety issue  Functional Limitations: carrying objects, lifting, pulling, pushing, uncomfortable because of pain and unable to perform repetitive tasks  Assessment details: The patient presents to physical therapy with complaints of right sided neck pain with radicular pain into her right upper extremity and right sided posterior hip pain with radicular pain into her right lower extremity. She would benefit  from skilled physical therapy as described below to address these limitations and allow the patient to return to her prior level of function.  Prognosis: good    Goals  Plan Goals: CERVICAL PROBLEMS    1. The patient has limited ROM.    LTG 1: 12 weeks:  The patient will demonstrate 75° of bilateral cervical spine rotation and 35° degrees of bilateral side bending in order to adequately monitor blind spots while driving and improve ability to perform activities of daily living.    STATUS:  New  STG 1a: 6 weeks:  The patient will demonstrate 70° of bilateral cervical spine rotation and 25° degrees of bilateral side bending in order to adequately monitor blind spots while driving and improve ability to perform activities of daily living.       STATUS:  New   TREATMENT:  Manual therapy, therapeutic exercise, home exercise instruction, cervical traction, and modalities as needed to include: moist heat, electrical stimulation, and ultrasound.     2. The patient has complaints of pain.   LTG 2: 12 weeks:  The patient will report 2/10 pain in order to more easily tolerate activities of daily living and improve sleep quality.    STATUS:  New   STG 2a: 6 weeks:  The patient will report 4/10 pain.    STATUS:  New   TREATMENT: Manual therapy, therapeutic exercise, home exercise instruction, cervical traction, and modalities as needed to include: moist heat, electrical stimulation, and ultrasound.    3. The patient reports radicular symptoms in the right upper extremity.   LTG 3: 12 weeks:  The patient will report a decrease in radicular symptoms in the right upper extremity by 75%.    STATUS:  New   STG 3a: 6 weeks:  The patient will report a decrease in radicular symptoms in the right upper extremity by 50%.    STATUS:  New   TREATMENT: Manual therapy, therapeutic exercise, home exercise instruction, cervical traction, and modalities as needed to include: moist heat, electrical stimulation, and ultrasound.    4. Carrying,  Moving, and Handling Objects Functional Limitation     LTG 4: 12 weeks:  The patient will demonstrate 25% limitation by achieving a score of 22/55 on the QuickDASH.    STATUS:  New   STG 4a: 6 weeks:  The patient will demonstrate 50% limitation by achieving a score of 33/55 on the QuickDASH.      STATUS:  New   TREATMENT:  Manual therapy, therapeutic exercise, home exercise instruction, and modalities as needed to include: moist heat, electrical stimulation, and ultrasound.    5. The patient reports radicular symptoms in the right lower extremity.   LTG 5: 12 weeks:  The patient will report a decrease in radicular symptoms in the right lower extremity by 75%.    STATUS:  New   STG 5a: 6 weeks:  The patient will report a decrease in radicular symptoms in the right lower extremity by 50%.    STATUS:  New   TREATMENT: Manual therapy, therapeutic exercise, home exercise instruction, cervical traction, and modalities as needed to include: moist heat, electrical stimulation, and ultrasound.    Plan  Therapy options: will be seen for skilled therapy services  Planned modality interventions: cryotherapy, electrical stimulation/Russian stimulation, TENS, traction, ultrasound and dry needling  Planned therapy interventions: ADL retraining, soft tissue mobilization, strengthening, stretching, therapeutic activities, joint mobilization, home exercise program, functional ROM exercises, flexibility, body mechanics training, postural training, neuromuscular re-education, manual therapy, motor coordination training, spinal/joint mobilization and IADL retraining  Frequency: 2x week  Duration in weeks: 12  Treatment plan discussed with: patient        Visit Diagnoses:    ICD-10-CM ICD-9-CM   1. DDD (degenerative disc disease), cervical  M50.30 722.4   2. Right shoulder pain, unspecified chronicity  M25.511 719.41   3. Right hip pain  M25.551 719.45       History # of Personal Factors and/or Comorbidities: LOW (0)  Examination of Body  System(s): # of elements: LOW (1-2)  Clinical Presentation: STABLE   Clinical Decision Making: LOW       Timed:         Manual Therapy:    0     mins  40514;     Therapeutic Exercise:    0     mins  95942;     Neuromuscular Ann:    0    mins  46537;    Therapeutic Activity:     0     mins  81365;     Gait Trainin     mins  40417;     Ultrasound:     0     mins  15965;    Ionto                               0    mins   56021  Self Care                       0     mins   80429  Canalith Repos    0     mins 53537      Un-Timed:  Electrical Stimulation:    0     mins  66667 ( );  Dry Needling     0     mins self-pay  Traction     0     mins 49081  Low Eval     40     Mins  27356  Mod Eval     0     Mins  13575  High Eval                       0     Mins  87753  Re-Eval                           0    mins  95091    Timed Treatment:   0   mins   Total Treatment:     40   mins    PT SIGNATURE: Rojas Davis PT    Electronically signed 2022    [place] License: PT - [id number]      Initial Certification  Certification Period: 2022 thru 2022  I certify that the therapy services are furnished while this patient is under my care.  The services outlined above are required by this patient, and will be reviewed every 90 days.     PHYSICIAN: Bozena Ballard APRN  NPI: [id number]      DATE:     Please sign and return via fax to [phone]. Thank you, [facility].

## 2022-06-30 ENCOUNTER — TREATMENT (OUTPATIENT)
Dept: PHYSICAL THERAPY | Facility: CLINIC | Age: 52
End: 2022-06-30

## 2022-06-30 DIAGNOSIS — M54.6 THORACIC BACK PAIN, UNSPECIFIED BACK PAIN LATERALITY, UNSPECIFIED CHRONICITY: ICD-10-CM

## 2022-06-30 DIAGNOSIS — M25.511 RIGHT SHOULDER PAIN, UNSPECIFIED CHRONICITY: ICD-10-CM

## 2022-06-30 DIAGNOSIS — M50.30 DDD (DEGENERATIVE DISC DISEASE), CERVICAL: Primary | ICD-10-CM

## 2022-06-30 DIAGNOSIS — M25.551 RIGHT HIP PAIN: ICD-10-CM

## 2022-06-30 PROCEDURE — 97110 THERAPEUTIC EXERCISES: CPT | Performed by: PHYSICAL THERAPIST

## 2022-06-30 PROCEDURE — 97140 MANUAL THERAPY 1/> REGIONS: CPT | Performed by: PHYSICAL THERAPIST

## 2022-06-30 NOTE — PROGRESS NOTES
Physical Therapy Daily Treatment Note      Patient: Kalpana Marquez   : 1970  Referring practitioner: MARY Means  Date of Initial Visit: Type: THERAPY  Noted: 2022  Today's Date: 2022  Patient seen for 2 sessions           Subjective Questionnaire:       Subjective Evaluation    History of Present Illness    Subjective comment: Pt reports that on a scale of 1-10 at times her RUE and hand/fingers pain is 15.  Pt reports at times it hurts to type and she is unable to write.  Pt reported today is a better day.       Objective   See Exercise, Manual, and Modality Logs for complete treatment.       Assessment & Plan     Assessment    Assessment details: Pt tolerated therapeutic exercise and manual well reporting she felt good after tx.  Continue with POC to improve pt's tolerance to work and daily activity to include reduced pain with typing and writing.        Visit Diagnoses:    ICD-10-CM ICD-9-CM   1. DDD (degenerative disc disease), cervical  M50.30 722.4   2. Right shoulder pain, unspecified chronicity  M25.511 719.41   3. Right hip pain  M25.551 719.45   4. Thoracic back pain, unspecified back pain laterality, unspecified chronicity  M54.6 724.1       Progress per Plan of Care and Progress strengthening /stabilization /functional activity           Timed:  Manual Therapy:    12     mins  59480;  Therapeutic Exercise:    18     mins  81653;     Neuromuscular Ann:        mins  31873;    Therapeutic Activity:          mins  33091;     Gait Training:           mins  07283;     Ultrasound:          mins  12631;    Electrical Stimulation:         mins  51340 ( );  Aquatic Therapy          mins  58444    Untimed:  Electrical Stimulation:         mins  75817 ( );  Mechanical Traction:         mins  08025;     Timed Treatment:   30   mins   Total Treatment:     30   mins    Electronically signed    Tia Arana PTA  Physical Therapist Assistant    [employer] license: [id number]

## 2022-07-07 ENCOUNTER — TREATMENT (OUTPATIENT)
Dept: PHYSICAL THERAPY | Facility: CLINIC | Age: 52
End: 2022-07-07

## 2022-07-07 DIAGNOSIS — M50.30 DDD (DEGENERATIVE DISC DISEASE), CERVICAL: Primary | ICD-10-CM

## 2022-07-07 DIAGNOSIS — M25.551 RIGHT HIP PAIN: ICD-10-CM

## 2022-07-07 DIAGNOSIS — M54.6 THORACIC BACK PAIN, UNSPECIFIED BACK PAIN LATERALITY, UNSPECIFIED CHRONICITY: ICD-10-CM

## 2022-07-07 DIAGNOSIS — M25.511 RIGHT SHOULDER PAIN, UNSPECIFIED CHRONICITY: ICD-10-CM

## 2022-07-07 PROCEDURE — 97110 THERAPEUTIC EXERCISES: CPT | Performed by: PHYSICAL THERAPIST

## 2022-07-07 PROCEDURE — 97140 MANUAL THERAPY 1/> REGIONS: CPT | Performed by: PHYSICAL THERAPIST

## 2022-07-07 NOTE — PROGRESS NOTES
Physical Therapy Daily Treatment Note      Patient: Kalpana Marquez   : 1970  Referring practitioner: MARY Means  Date of Initial Visit: Type: THERAPY  Noted: 2022  Today's Date: 2022  Patient seen for 3 sessions           Subjective Questionnaire:       Subjective Evaluation    History of Present Illness    Subjective comment: Pt reported cracking in her neck on it's own this morning when she woke up.  Pt reported RLE pain today.       Objective   See Exercise, Manual, and Modality Logs for complete treatment.       Assessment & Plan     Assessment    Assessment details: Pt reported RLE pain that is worse with standing and walking.  Pt tolerated tx well today and will benefit from continued PT.  Concentrated on manual today and will continue with therapeutic exercise tomorrow.        Visit Diagnoses:    ICD-10-CM ICD-9-CM   1. DDD (degenerative disc disease), cervical  M50.30 722.4   2. Right shoulder pain, unspecified chronicity  M25.511 719.41   3. Right hip pain  M25.551 719.45   4. Thoracic back pain, unspecified back pain laterality, unspecified chronicity  M54.6 724.1       Progress per Plan of Care and Progress strengthening /stabilization /functional activity           Timed:  Manual Therapy:    23     mins  42277;  Therapeutic Exercise:    5     mins  86992;     Neuromuscular Ann:        mins  94290;    Therapeutic Activity:          mins  19777;     Gait Training:           mins  28935;     Ultrasound:          mins  53058;    Electrical Stimulation:         mins  25811 ( );  Aquatic Therapy          mins  86275    Untimed:  Electrical Stimulation:         mins  81346 ( );  Mechanical Traction:         mins  43773;     Timed Treatment:   28   mins   Total Treatment:    28   mins    Electronically signed    Tia Arana PTA  Physical Therapist Assistant    [employer] license: [id number]

## 2022-07-14 ENCOUNTER — TREATMENT (OUTPATIENT)
Dept: PHYSICAL THERAPY | Facility: CLINIC | Age: 52
End: 2022-07-14

## 2022-07-14 DIAGNOSIS — M25.511 RIGHT SHOULDER PAIN, UNSPECIFIED CHRONICITY: ICD-10-CM

## 2022-07-14 DIAGNOSIS — M54.6 THORACIC BACK PAIN, UNSPECIFIED BACK PAIN LATERALITY, UNSPECIFIED CHRONICITY: ICD-10-CM

## 2022-07-14 DIAGNOSIS — M25.551 RIGHT HIP PAIN: ICD-10-CM

## 2022-07-14 DIAGNOSIS — M50.30 DDD (DEGENERATIVE DISC DISEASE), CERVICAL: Primary | ICD-10-CM

## 2022-07-14 PROCEDURE — 97140 MANUAL THERAPY 1/> REGIONS: CPT | Performed by: PHYSICAL THERAPIST

## 2022-07-14 PROCEDURE — 97110 THERAPEUTIC EXERCISES: CPT | Performed by: PHYSICAL THERAPIST

## 2022-07-14 NOTE — PROGRESS NOTES
Physical Therapy Daily Treatment Note      Patient: Kalpana Marquez   : 1970  Referring practitioner: MARY Means  Date of Initial Visit: Type: THERAPY  Noted: 2022  Today's Date: 2022  Patient seen for 4 sessions           Subjective Questionnaire:       Subjective Evaluation    History of Present Illness    Subjective comment: Pt presents to clinic with report of 7/10 pain in R shoulder and from elbow to wrist around the entire lower arm.       Objective   See Exercise, Manual, and Modality Logs for complete treatment.       Assessment & Plan     Assessment    Assessment details: Pt reported increased RUE pain with UBE reporting pain all the way down and stating it was weak and did the same with strengthening exercise.  Pt tolerated manual well and reported minimal relief with tx today.  Pt did not have c/o hip pain today.  Continue with POC.        Visit Diagnoses:    ICD-10-CM ICD-9-CM   1. DDD (degenerative disc disease), cervical  M50.30 722.4   2. Right shoulder pain, unspecified chronicity  M25.511 719.41   3. Right hip pain  M25.551 719.45   4. Thoracic back pain, unspecified back pain laterality, unspecified chronicity  M54.6 724.1       Progress per Plan of Care and Progress strengthening /stabilization /functional activity           Timed:  Manual Therapy:    12     mins  55047;  Therapeutic Exercise:    18     mins  91887;     Neuromuscular Ann:        mins  56501;    Therapeutic Activity:          mins  62487;     Gait Training:           mins  35238;     Ultrasound:          mins  86408;    Electrical Stimulation:         mins  91062 ( );  Aquatic Therapy          mins  87025    Untimed:  Electrical Stimulation:         mins  99472 ( );  Mechanical Traction:         mins  87668;     Timed Treatment:   30   mins   Total Treatment:     30   mins    Electronically signed    Tia Arana PTA  Physical Therapist Assistant    [employer] license: [id number]

## 2022-07-19 ENCOUNTER — OFFICE VISIT (OUTPATIENT)
Dept: FAMILY MEDICINE CLINIC | Facility: CLINIC | Age: 52
End: 2022-07-19

## 2022-07-19 VITALS
HEIGHT: 61 IN | TEMPERATURE: 98 F | RESPIRATION RATE: 18 BRPM | DIASTOLIC BLOOD PRESSURE: 76 MMHG | SYSTOLIC BLOOD PRESSURE: 104 MMHG | HEART RATE: 82 BPM | BODY MASS INDEX: 31.04 KG/M2 | OXYGEN SATURATION: 97 % | WEIGHT: 164.4 LBS

## 2022-07-19 DIAGNOSIS — R53.83 FATIGUE, UNSPECIFIED TYPE: ICD-10-CM

## 2022-07-19 DIAGNOSIS — J01.40 ACUTE NON-RECURRENT PANSINUSITIS: ICD-10-CM

## 2022-07-19 DIAGNOSIS — G47.19 EXCESSIVE DAYTIME SLEEPINESS: Primary | ICD-10-CM

## 2022-07-19 DIAGNOSIS — E66.09 CLASS 1 OBESITY DUE TO EXCESS CALORIES WITH SERIOUS COMORBIDITY AND BODY MASS INDEX (BMI) OF 31.0 TO 31.9 IN ADULT: ICD-10-CM

## 2022-07-19 PROBLEM — E66.811 CLASS 1 OBESITY DUE TO EXCESS CALORIES WITH SERIOUS COMORBIDITY AND BODY MASS INDEX (BMI) OF 31.0 TO 31.9 IN ADULT: Status: ACTIVE | Noted: 2022-07-19

## 2022-07-19 PROCEDURE — 96372 THER/PROPH/DIAG INJ SC/IM: CPT

## 2022-07-19 PROCEDURE — 99214 OFFICE O/P EST MOD 30 MIN: CPT

## 2022-07-19 RX ORDER — AZITHROMYCIN 250 MG/1
TABLET, FILM COATED ORAL
Qty: 6 TABLET | Refills: 0 | Status: SHIPPED | OUTPATIENT
Start: 2022-07-19 | End: 2022-08-23

## 2022-07-19 RX ORDER — PHENTERMINE HYDROCHLORIDE 37.5 MG/1
37.5 CAPSULE ORAL EVERY MORNING
Qty: 30 CAPSULE | Refills: 0 | Status: SHIPPED | OUTPATIENT
Start: 2022-07-19 | End: 2022-08-23

## 2022-07-19 RX ADMIN — CYANOCOBALAMIN 1000 MCG: 1000 INJECTION, SOLUTION INTRAMUSCULAR; SUBCUTANEOUS at 11:08

## 2022-07-19 NOTE — ASSESSMENT & PLAN NOTE
Continue on phentermine, patient did not get benefit this past month as she was fasting.  UDS/consent reviewed, Mark reviewed.  Discussed risk/benefits of this medication.  Patient to follow-up in 1 month.

## 2022-07-19 NOTE — ASSESSMENT & PLAN NOTE
Question whether patient could have some narcolepsy, will refer back to sleep management for further testing and evaluation of this.  If so, patient would likely benefit from stimulant long-term to assist with extreme daytime sleepiness and fatigue.

## 2022-07-19 NOTE — PROGRESS NOTES
Kalpana Marquez presents to [facility] who presents to clinic for 1 month follow-up.      History of Present Illness  This is a 52-year-old female who presents to clinic for 1 month follow-up.    Obesity/weight gain: Patient states that she was unable to be on the phentermine for the entire month that she did fast for about 18 days.  Patient states that she only consistently took the phentermine for 1 week, did state that it helped with energy, but was unable to see a big benefit and weight loss as she was only on it for 1 week before she had to go into fasting.  Would like to continue on this, states that she is not can be fasting in the near future, and we will see if it would provide better benefit at that time.  Also discussed diet changes and increased exercise.  Denied any side effects of this medication.    Fatigue/daytime sleepiness: Patient still has an issue with this, states that it is getting worse she feels like, states then this is not her normal self.  Has been evaluated by sleep in the past but mainly for sleep apnea, do question whether patient could have some narcolepsy.  Patient states that she will sleep 7 to 8 hours a night, and still wake up extremely exhausted and be exhausted throughout the day.  She states that the phentermine is giving her some benefit with a little bit more energy, but states that she needs something to try to help with this.  Has not seen sleep medicine in several years, is okay with us sending her back there for further evaluation.    Patient also states that she is got some sinus pressure in the top part of her head, states that she is got a headache associated.  Also is having some body aches associated with this further.  Is unsure what could be causing this, does feel like she is starting to get sick, does have some issues with pretty bad allergies.  Is not having any nasal drainage at this time, but is wondering if that is good to come  "next.  Denies any other associated symptoms.    The following portions of the patient's history were personally reviewed and updated as appropriate: allergies, current medications, past medical history, past surgical history, past family history, and past social history.       Objective   Vital Signs:   /76   Pulse 82   Temp 98 °F (36.7 °C)   Resp 18   Ht 154.9 cm (60.98\")   Wt 74.6 kg (164 lb 6.4 oz)   SpO2 97%   BMI 31.08 kg/m²     Body mass index is 31.08 kg/m².    All labs, imaging, test results, and specialty provider notes reviewed with patient.     Physical Exam  Vitals reviewed.   Constitutional:       Appearance: Normal appearance.   Cardiovascular:      Rate and Rhythm: Normal rate and regular rhythm.      Pulses: Normal pulses.      Heart sounds: Normal heart sounds.   Pulmonary:      Effort: Pulmonary effort is normal.      Breath sounds: Normal breath sounds.   Neurological:      General: No focal deficit present.      Mental Status: She is alert and oriented to person, place, and time.          Assessment and Plan:  Diagnoses and all orders for this visit:    1. Excessive daytime sleepiness (Primary)  Assessment & Plan:  Question whether patient could have some narcolepsy, will refer back to sleep management for further testing and evaluation of this.  If so, patient would likely benefit from stimulant long-term to assist with extreme daytime sleepiness and fatigue.    Orders:  -     Ambulatory Referral to Sleep Medicine    2. Class 1 obesity due to excess calories with serious comorbidity and body mass index (BMI) of 31.0 to 31.9 in adult  Assessment & Plan:  Continue on phentermine, patient did not get benefit this past month as she was fasting.  UDS/consent reviewed, Mark reviewed.  Discussed risk/benefits of this medication.  Patient to follow-up in 1 month.    Orders:  -     phentermine 37.5 MG capsule; Take 1 capsule by mouth Every Morning.  Dispense: 30 capsule; Refill: 0    3. " Fatigue, unspecified type  -     Ambulatory Referral to Sleep Medicine    4. Acute non-recurrent pansinusitis  Comments:  We will go ahead and treat with a Z-Andreas to help with any possible sinus infection that could be occurring as well.  Orders:  -     azithromycin (Zithromax Z-Andreas) 250 MG tablet; Take 2 tablets by mouth on day 1, then 1 tablet daily on days 2-5  Dispense: 6 tablet; Refill: 0        Follow Up:  No follow-ups on file.    Patient was given instructions and counseling regarding her condition or for health maintenance advice. Please see specific information pulled into the AVS if appropriate.

## 2022-07-22 ENCOUNTER — TREATMENT (OUTPATIENT)
Dept: PHYSICAL THERAPY | Facility: CLINIC | Age: 52
End: 2022-07-22

## 2022-07-22 DIAGNOSIS — M25.551 RIGHT HIP PAIN: ICD-10-CM

## 2022-07-22 DIAGNOSIS — M25.511 RIGHT SHOULDER PAIN, UNSPECIFIED CHRONICITY: ICD-10-CM

## 2022-07-22 DIAGNOSIS — M50.30 DDD (DEGENERATIVE DISC DISEASE), CERVICAL: Primary | ICD-10-CM

## 2022-07-22 DIAGNOSIS — M54.6 THORACIC BACK PAIN, UNSPECIFIED BACK PAIN LATERALITY, UNSPECIFIED CHRONICITY: ICD-10-CM

## 2022-07-22 PROCEDURE — 97530 THERAPEUTIC ACTIVITIES: CPT | Performed by: PHYSICAL THERAPIST

## 2022-07-22 PROCEDURE — 97140 MANUAL THERAPY 1/> REGIONS: CPT | Performed by: PHYSICAL THERAPIST

## 2022-07-22 NOTE — PROGRESS NOTES
Progress Note      Patient: Kalpana Marquez   : 1970  Diagnosis/ICD-10 Code:  DDD (degenerative disc disease), cervical [M50.30]  Referring practitioner: MARY Means  Date of Initial Visit: Type: THERAPY  Noted: 2022  Today's Date: 2022  Patient seen for 5 sessions      Subjective:   Subjective Questionnaire: QuickDASH: 35/55 = 54.54% Disability  Clinical Progress: Somewhat improved  Home Program Compliance: Yes  Treatment has included: therapeutic exercise, manual therapy and therapeutic activity    Subjective   The patient reported that her neck pain and hip pain are each a 6/10 today. She is still experiencing radicular pain into her arm and leg, but they are each improving. 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 She would like to continue with PT because she feels like it is helping.    Objective          Tenderness     Additional Tenderness Details  Tenderness in right cervical paraspinals, UT, wrist extensors.  Tenderness in right piriformis    Neurological Testing     Additional Neurological Details  Sensation to light touch intact and equal bilaterally except for hypersensation on right at C7 dermatomal level  Seated slump: (+) on right for increased sciatic neural tension , (-) on left    Supine passive SLR: (-) bilaterally for increased sciatic neural tension.    Active Range of Motion   Cervical/Thoracic Spine   Cervical    Flexion: 50 degrees   Extension: 45 degrees   Left lateral flexion: 30 degrees with pain  Right lateral flexion: 30 degrees with pain  Left rotation: 75 (pain on right) degrees   Right rotation: 70 degrees     Additional Active Range of Motion Details  Bilateral shoulder AROM  Grossly WFL  Lumbar AROM grossly WFL    Strength/Myotome Testing     Left Shoulder     Planes of Motion   Flexion: 4+   Abduction: 4+   External rotation at 0°: 5   Internal rotation at 0°: 5     Right Shoulder      Planes of Motion   Flexion: 4+   Abduction: 4+   External rotation at 0°: 5   Internal rotation at 0°: 5     Left Elbow   Flexion: 5    Right Elbow   Flexion: 5    Left Wrist/Hand   Wrist extension: 5     (2nd hand position)     Trial 1: 45 lbs    Trial 2: 42 lbs    Trial 3: 47 lbs    Average: 44.67 lbs    Right Wrist/Hand   Wrist extension: 4+     (2nd hand position)     Trial 1: 38 lbs    Trial 2: 42 lbs    Trial 3: 40 lbs    Average: 40 lbs    Left Hip   Planes of Motion   Flexion: 4+  Extension: 4-    Right Hip   Planes of Motion   Flexion: 4+  Extension: 4-    Left Knee   Flexion: 5  Extension: 5    Right Knee   Flexion: 5  Extension: 5    Left Ankle/Foot   Dorsiflexion: 5    Right Ankle/Foot   Dorsiflexion: 5    Tests     Right Wrist/Hand   Negative Phalen's sign and Tinel's sign (medial nerve).       See Exercise, Manual, and Modality Logs for complete treatment.     Assessment & Plan     Assessment    Assessment details: The patient was re-evaluated today and presents with improvements in cervical ROM, shoulder strength, hip strength, and function (QuickDASH) compared to her initial evaluation. She is still limited by moderate right sided cervical pain with radicular symptoms and moderate right hip pain. She would benefit from continued skilled physical therapy to address remaining functional deficits and to allow the patient to return to her prior level of function.    Goals  Plan Goals: CERVICAL PROBLEMS    1. The patient has limited ROM.    LTG 1: 12 weeks:  The patient will demonstrate 75° of bilateral cervical spine rotation and 35° degrees of bilateral side bending in order to adequately monitor blind spots while driving and improve ability to perform activities of daily living.    STATUS:  Progressing  STG 1a: 6 weeks:  The patient will demonstrate 70° of bilateral cervical spine rotation and 25° degrees of bilateral side bending in order to adequately monitor blind spots while driving and  improve ability to perform activities of daily living.      STATUS:  Met   TREATMENT:  Manual therapy, therapeutic exercise, home exercise instruction, cervical traction, and modalities as needed to include: moist heat, electrical stimulation, and ultrasound.     2. The patient has complaints of pain.   LTG 2: 12 weeks:  The patient will report 2/10 pain in order to more easily tolerate activities of daily living and improve sleep quality.    STATUS:  Progressing   STG 2a: 6 weeks:  The patient will report 4/10 pain.    STATUS:  Progressing   TREATMENT: Manual therapy, therapeutic exercise, home exercise instruction, cervical traction, and modalities as needed to include: moist heat, electrical stimulation, and ultrasound.    3. The patient reports radicular symptoms in the right upper extremity.   LTG 3: 12 weeks:  The patient will report a decrease in radicular symptoms in the right upper extremity by 75%.    STATUS:  Progressing   STG 3a: 6 weeks:  The patient will report a decrease in radicular symptoms in the right upper extremity by 50%.    STATUS:  Progressing   TREATMENT: Manual therapy, therapeutic exercise, home exercise instruction, cervical traction, and modalities as needed to include: moist heat, electrical stimulation, and ultrasound.    4. Carrying, Moving, and Handling Objects Functional Limitation     LTG 4: 12 weeks:  The patient will demonstrate 25% limitation by achieving a score of 22/55 on the QuickDASH.    STATUS:  Progressing   STG 4a: 6 weeks:  The patient will demonstrate 50% limitation by achieving a score of 33/55 on the QuickDASH.      STATUS:  Progressing   TREATMENT:  Manual therapy, therapeutic exercise, home exercise instruction, and modalities as needed to include: moist heat, electrical stimulation, and ultrasound.    5. The patient reports radicular symptoms in the right lower extremity.   LTG 5: 12 weeks:  The patient will report a decrease in radicular symptoms in the right  lower extremity by 75%.    STATUS:  Progressing   STG 5a: 6 weeks:  The patient will report a decrease in radicular symptoms in the right lower extremity by 50%.    STATUS:  Progressing   TREATMENT: Manual therapy, therapeutic exercise, home exercise instruction, cervical traction, and modalities as needed to include: moist heat, electrical stimulation, and ultrasound.    Plan  Therapy options: will be seen for skilled therapy services      Progress toward previous goals: Partially Met      Recommendations: Continue as planned  Timeframe: 2 months  Prognosis to achieve goals: good    PT Signature: Rojas Davis PT    Electronically signed 2022    [place] License: PT - [id number]       Timed:  Manual Therapy:    12     mins  11787;  Therapeutic Exercise:    6     mins  72391;     Neuromuscular Ann:    0    mins  17436;    Therapeutic Activity:     6     mins  32334;     Gait Trainin     mins  84138;     Aquatics                         0      mins  61954    Un-timed:  Mechanical Traction      0     mins  77016  Dry Needling     0     mins self-pay  Electrical Stimulation:    0     mins  61783 ( );    Timed Treatment:   24   mins   Total Treatment:     30   mins

## 2022-07-26 ENCOUNTER — OFFICE VISIT (OUTPATIENT)
Dept: SLEEP MEDICINE | Facility: HOSPITAL | Age: 52
End: 2022-07-26

## 2022-07-26 ENCOUNTER — TELEPHONE (OUTPATIENT)
Dept: FAMILY MEDICINE CLINIC | Facility: CLINIC | Age: 52
End: 2022-07-26

## 2022-07-26 ENCOUNTER — TELEPHONE (OUTPATIENT)
Dept: PHYSICAL THERAPY | Facility: CLINIC | Age: 52
End: 2022-07-26

## 2022-07-26 VITALS
HEART RATE: 80 BPM | BODY MASS INDEX: 30.96 KG/M2 | HEIGHT: 61 IN | WEIGHT: 164 LBS | OXYGEN SATURATION: 98 % | DIASTOLIC BLOOD PRESSURE: 87 MMHG | SYSTOLIC BLOOD PRESSURE: 136 MMHG

## 2022-07-26 DIAGNOSIS — G47.419 NARCOLEPSY WITHOUT CATAPLEXY: Primary | ICD-10-CM

## 2022-07-26 DIAGNOSIS — E66.9 OBESITY (BMI 30-39.9): ICD-10-CM

## 2022-07-26 DIAGNOSIS — H57.9 EYE PRESSURE: Primary | ICD-10-CM

## 2022-07-26 PROCEDURE — G0463 HOSPITAL OUTPT CLINIC VISIT: HCPCS

## 2022-07-26 PROCEDURE — 99204 OFFICE O/P NEW MOD 45 MIN: CPT | Performed by: FAMILY MEDICINE

## 2022-07-26 NOTE — PROGRESS NOTES
[facility] New Patient/Consultation       Reason for Consultation: sleep disturbances      Patient Care Team:  Bozena Ballard APRN as PCP - General (Nurse Practitioner)  Yudi Mercado MD as Consulting Physician (Sleep Medicine)      History of present illness:  Thank you for asking me to see your patient.  The patient is a 52 y.o. female With arthritis hypertension who presents today to discuss sleep disturbances.  I reviewed copy of PSG from July 2016 which showed overall AHI of 4.9 events per hour lowest SPO2 77%.  MSLT was done which showed average sleep latency of 1 minute 1 seconds and 1 sleep onset REM were average REM latency was 3 minutes. Findings suggestive of idiopathic hypersomnia versus narcolepsy.  Presents today to discuss results and treatment options.      Social History: See scanned material    Allergies:  Latex    Family History: MICHAEL no       Current Outpatient Medications:   •  amLODIPine (NORVASC) 10 MG tablet, TAKE 1 TABLET(10 MG) BY MOUTH EVERY DAY, Disp: 90 tablet, Rfl: 1  •  azithromycin (Zithromax Z-Andreas) 250 MG tablet, Take 2 tablets by mouth on day 1, then 1 tablet daily on days 2-5, Disp: 6 tablet, Rfl: 0  •  benzonatate (TESSALON) 100 MG capsule, Take 1 capsule by mouth 3 (Three) Times a Day As Needed for Cough., Disp: 30 capsule, Rfl: 0  •  cyclobenzaprine (FLEXERIL) 5 MG tablet, Take 5 mg by mouth At Night As Needed., Disp: , Rfl:   •  diclofenac (VOLTAREN) 75 MG EC tablet, Take 75 mg by mouth Daily., Disp: , Rfl:   •  gabapentin (NEURONTIN) 300 MG capsule, Take 1 capsule by mouth 2 (two) times a day., Disp: 60 capsule, Rfl: 3  •  loratadine (CLARITIN) 10 MG tablet, Take 10 mg by mouth Every Morning., Disp: , Rfl:   •  meloxicam (MOBIC) 7.5 MG tablet, Take 7.5 mg by mouth Daily., Disp: , Rfl:   •  phentermine 37.5 MG capsule, Take 1 capsule by mouth Every Morning., Disp: 30 capsule, Rfl: 0  •  vitamin D (ERGOCALCIFEROL) 1.25 MG (75565 UT) capsule capsule, Take 1  "capsule by mouth Every 7 (Seven) Days., Disp: 12 capsule, Rfl: 1    Current Facility-Administered Medications:   •  cyanocobalamin injection 1,000 mcg, 1,000 mcg, Intramuscular, Q28 Days, IvettramírezBozena greenberg APRN, 1,000 mcg at 07/19/22 1108    Vital Signs:    Vitals:    07/26/22 1100   BP: 136/87   Pulse: 80   SpO2: 98%   Weight: 74.4 kg (164 lb)   Height: 154.9 cm (61\")      Body mass index is 30.99 kg/m².  Neck Circumference: 14 inches      REVIEW OF SYSTEMS.  Full review of systems available on the intake form which is scanned in the media tab.  The relevant positive are noted below  1. Daytime excessive sleepiness with Lamona Sleepiness Scale :Total score: 16         Physical exam:  Vitals:    07/26/22 1100   BP: 136/87   Pulse: 80   SpO2: 98%   Weight: 74.4 kg (164 lb)   Height: 154.9 cm (61\")    Body mass index is 30.99 kg/m². Neck Circumference: 14 inches  HEENT: Head is atraumatic, normocephalic  NECK:Neck Circumference: 14 inches  RESPIRATORY SYSTEM: Regular respirations  CARDIOVASULAR SYSTEM: Regular rate  EXTREMITES: No cyanosis, clubbing  NEUROLOGICAL SYSTEM: Oriented x 3, no gross motor defects, gait normal      Impression:  1. Excessive daytime sleepiness    2. Fatigue, unspecified type        Plan:    Good sleep hygiene measures should be maintained.  Weight loss would be beneficial in this patient who is obese BMI 31.    Reviewed sleep study results with her she has narcolepsy without cataplexy.  We will hold off on starting stimulant right now she is currently completing treatment phentermine.  Her last time she make stimulant with phentermine she had severe side effects.  She will call when she is off of phentermine we can try modafinil.    Thank you for allowing me to participate in your patient's care.    Yudi Mercado MD  Sleep Medicine  07/26/22  12:02 EDT      "

## 2022-07-28 ENCOUNTER — TREATMENT (OUTPATIENT)
Dept: PHYSICAL THERAPY | Facility: CLINIC | Age: 52
End: 2022-07-28

## 2022-07-28 DIAGNOSIS — M54.6 THORACIC BACK PAIN, UNSPECIFIED BACK PAIN LATERALITY, UNSPECIFIED CHRONICITY: ICD-10-CM

## 2022-07-28 DIAGNOSIS — M50.30 DDD (DEGENERATIVE DISC DISEASE), CERVICAL: Primary | ICD-10-CM

## 2022-07-28 DIAGNOSIS — M25.511 RIGHT SHOULDER PAIN, UNSPECIFIED CHRONICITY: ICD-10-CM

## 2022-07-28 DIAGNOSIS — M25.551 RIGHT HIP PAIN: ICD-10-CM

## 2022-07-28 PROCEDURE — 97110 THERAPEUTIC EXERCISES: CPT | Performed by: PHYSICAL THERAPIST

## 2022-07-28 PROCEDURE — 97530 THERAPEUTIC ACTIVITIES: CPT | Performed by: PHYSICAL THERAPIST

## 2022-07-28 NOTE — PROGRESS NOTES
Physical Therapy Daily Treatment Note      Patient: Kalpana Marquez   : 1970  Referring practitioner: MARY Means  Date of Initial Visit: Type: THERAPY  Noted: 2022  Today's Date: 2022  Patient seen for 6 sessions           Subjective Questionnaire:       Subjective Evaluation    History of Present Illness    Subjective comment: Pt reports having  a lot of pain in R forearm when she woke up this morning.  Pt reports RLE has intermittent pain and at times when she is walking her leg feels heavy.       Objective   See Exercise, Manual, and Modality Logs for complete treatment.       Assessment/Plan    Visit Diagnoses:    ICD-10-CM ICD-9-CM   1. DDD (degenerative disc disease), cervical  M50.30 722.4   2. Right shoulder pain, unspecified chronicity  M25.511 719.41   3. Right hip pain  M25.551 719.45   4. Thoracic back pain, unspecified back pain laterality, unspecified chronicity  M54.6 724.1       Progress per Plan of Care and Progress strengthening /stabilization /functional activity           Timed:  Manual Therapy:    12     mins  44265;  Therapeutic Exercise:    12     mins  82339;     Neuromuscular Ann:        mins  77730;    Therapeutic Activity:     8     mins  54402;     Gait Training:           mins  51330;     Ultrasound:          mins  96529;    Electrical Stimulation:         mins  49060 ( );  Aquatic Therapy          mins  71988    Untimed:  Electrical Stimulation:         mins  81896 ( );  Mechanical Traction:         mins  45848;     Timed Treatment:   32   mins   Total Treatment:     32   mins    Electronically signed    Tia Arana PTA  Physical Therapist Assistant    [employer] license: [id number]

## 2022-07-29 ENCOUNTER — TREATMENT (OUTPATIENT)
Dept: PHYSICAL THERAPY | Facility: CLINIC | Age: 52
End: 2022-07-29

## 2022-07-29 DIAGNOSIS — M25.511 RIGHT SHOULDER PAIN, UNSPECIFIED CHRONICITY: ICD-10-CM

## 2022-07-29 DIAGNOSIS — M50.30 DDD (DEGENERATIVE DISC DISEASE), CERVICAL: Primary | ICD-10-CM

## 2022-07-29 DIAGNOSIS — M25.551 RIGHT HIP PAIN: ICD-10-CM

## 2022-07-29 DIAGNOSIS — M54.6 THORACIC BACK PAIN, UNSPECIFIED BACK PAIN LATERALITY, UNSPECIFIED CHRONICITY: ICD-10-CM

## 2022-07-29 PROCEDURE — 97110 THERAPEUTIC EXERCISES: CPT | Performed by: PHYSICAL THERAPIST

## 2022-07-29 PROCEDURE — 97140 MANUAL THERAPY 1/> REGIONS: CPT | Performed by: PHYSICAL THERAPIST

## 2022-07-29 NOTE — PROGRESS NOTES
Physical Therapy Daily Treatment Note      Patient: Kalpana Marquez   : 1970  Referring practitioner: MARY Means  Date of Initial Visit: Type: THERAPY  Noted: 2022  Today's Date: 2022  Patient seen for 7 sessions           Subjective Questionnaire:       Subjective Evaluation    History of Present Illness    Subjective comment: Pt reports 9/10 distal scapular pain stating she slifted and carried 3 cases of waer from shelf to cart to car to inside the house.         Objective   See Exercise, Manual, and Modality Logs for complete treatment.       Assessment & Plan     Assessment    Assessment details: Pt tolerated strengthening exercise.  Pt reported scapular area felt better after manual work  Pt reports her RLE feels better today.        Visit Diagnoses:    ICD-10-CM ICD-9-CM   1. DDD (degenerative disc disease), cervical  M50.30 722.4   2. Right shoulder pain, unspecified chronicity  M25.511 719.41   3. Right hip pain  M25.551 719.45   4. Thoracic back pain, unspecified back pain laterality, unspecified chronicity  M54.6 724.1       Progress per Plan of Care and Progress strengthening /stabilization /functional activity           Timed:  Manual Therapy:    8     mins  78707;  Therapeutic Exercise:    14     mins  75089;     Neuromuscular Ann:        mins  73710;    Therapeutic Activity:     10     mins  40403;     Gait Training:           mins  89012;     Ultrasound:          mins  12610;    Electrical Stimulation:         mins  39560 ( );  Aquatic Therapy          mins  46441    Untimed:  Electrical Stimulation:         mins  33790 ( );  Mechanical Traction:         mins  85282;     Timed Treatment:   32   mins   Total Treatment:     32   mins    Electronically signed    Tia Arana PTA  Physical Therapist Assistant    [employer] license: [id number]

## 2022-08-03 ENCOUNTER — TREATMENT (OUTPATIENT)
Dept: PHYSICAL THERAPY | Facility: CLINIC | Age: 52
End: 2022-08-03

## 2022-08-03 DIAGNOSIS — M50.30 DDD (DEGENERATIVE DISC DISEASE), CERVICAL: Primary | ICD-10-CM

## 2022-08-03 DIAGNOSIS — M25.551 RIGHT HIP PAIN: ICD-10-CM

## 2022-08-03 DIAGNOSIS — M25.511 RIGHT SHOULDER PAIN, UNSPECIFIED CHRONICITY: ICD-10-CM

## 2022-08-03 DIAGNOSIS — M54.6 THORACIC BACK PAIN, UNSPECIFIED BACK PAIN LATERALITY, UNSPECIFIED CHRONICITY: ICD-10-CM

## 2022-08-03 PROCEDURE — 97110 THERAPEUTIC EXERCISES: CPT | Performed by: PHYSICAL THERAPIST

## 2022-08-03 PROCEDURE — 97530 THERAPEUTIC ACTIVITIES: CPT | Performed by: PHYSICAL THERAPIST

## 2022-08-03 NOTE — PROGRESS NOTES
Physical Therapy Daily Treatment Note      Patient: Kalpana Marquez   : 1970  Referring practitioner: MARY Means  Date of Initial Visit: Type: THERAPY  Noted: 2022  Today's Date: 8/3/2022  Patient seen for 8 sessions           Subjective Questionnaire:       Subjective Evaluation    History of Present Illness    Subjective comment: Pt reports her neck is ok and reports random RUE feeling that they are heavy and random feeling heavy and when she stands it feels tight.       Objective   See Exercise, Manual, and Modality Logs for complete treatment.       Assessment & Plan     Assessment    Assessment details: Pt reports feeling better after manual work.  Pt reports difficulty performing quadraped exercise secondary to wrist/hand pain.          Visit Diagnoses:    ICD-10-CM ICD-9-CM   1. DDD (degenerative disc disease), cervical  M50.30 722.4   2. Right shoulder pain, unspecified chronicity  M25.511 719.41   3. Right hip pain  M25.551 719.45   4. Thoracic back pain, unspecified back pain laterality, unspecified chronicity  M54.6 724.1       Progress per Plan of Care and Progress strengthening /stabilization /functional activity           Timed:  Manual Therapy:    10     mins  71814;  Therapeutic Exercise:    12     mins  72768;     Neuromuscular Ann:        mins  48840;    Therapeutic Activity:    8      mins  73908;     Gait Training:           mins  43271;     Ultrasound:          mins  03321;    Electrical Stimulation:         mins  25962 ( );  Aquatic Therapy          mins  69597    Untimed:  Electrical Stimulation:         mins  85141 ( );  Mechanical Traction:         mins  88007;     Timed Treatment:   30   mins   Total Treatment:     30   mins    Electronically signed    Tia Arana PTA  Physical Therapist Assistant    [employer] license: [id number]

## 2022-08-04 ENCOUNTER — TREATMENT (OUTPATIENT)
Dept: PHYSICAL THERAPY | Facility: CLINIC | Age: 52
End: 2022-08-04

## 2022-08-04 DIAGNOSIS — M25.511 RIGHT SHOULDER PAIN, UNSPECIFIED CHRONICITY: ICD-10-CM

## 2022-08-04 DIAGNOSIS — M25.551 RIGHT HIP PAIN: ICD-10-CM

## 2022-08-04 DIAGNOSIS — M50.30 DDD (DEGENERATIVE DISC DISEASE), CERVICAL: Primary | ICD-10-CM

## 2022-08-04 DIAGNOSIS — M54.6 THORACIC BACK PAIN, UNSPECIFIED BACK PAIN LATERALITY, UNSPECIFIED CHRONICITY: ICD-10-CM

## 2022-08-04 PROCEDURE — 97110 THERAPEUTIC EXERCISES: CPT | Performed by: PHYSICAL THERAPIST

## 2022-08-04 PROCEDURE — 97140 MANUAL THERAPY 1/> REGIONS: CPT | Performed by: PHYSICAL THERAPIST

## 2022-08-04 PROCEDURE — 97530 THERAPEUTIC ACTIVITIES: CPT | Performed by: PHYSICAL THERAPIST

## 2022-08-04 NOTE — PROGRESS NOTES
Physical Therapy Daily Treatment Note      Patient: Kalpana Marquez   : 1970  Referring practitioner: MARY Means  Date of Initial Visit: Type: THERAPY  Noted: 2022  Today's Date: 2022  Patient seen for 9 sessions           Subjective Questionnaire:       Subjective Evaluation    History of Present Illness    Subjective comment: Pt reports being pain free since last visit and feels good today.       Objective   See Exercise, Manual, and Modality Logs for complete treatment.       Assessment & Plan     Assessment    Assessment details: Pt reported prone lying and press ups felt good.  Pt tolerated tx well and exhibits progress with report of less pain. Applied IASTM to R wrist extensors today.  Continue with POC.          Visit Diagnoses:    ICD-10-CM ICD-9-CM   1. DDD (degenerative disc disease), cervical  M50.30 722.4   2. Right shoulder pain, unspecified chronicity  M25.511 719.41   3. Right hip pain  M25.551 719.45   4. Thoracic back pain, unspecified back pain laterality, unspecified chronicity  M54.6 724.1       Progress per Plan of Care and Progress strengthening /stabilization /functional activity           Timed:  Manual Therapy:    20     mins  60477;  Therapeutic Exercise:    25     mins  56079;     Neuromuscular Ann:        mins  43654;    Therapeutic Activity:     8     mins  26389;     Gait Training:           mins  70795;     Ultrasound:          mins  84307;    Electrical Stimulation:         mins  44620 ( );  Aquatic Therapy          mins  86950    Untimed:  Electrical Stimulation:         mins  86914 ( );  Mechanical Traction:         mins  27840;     Timed Treatment:   53   mins   Total Treatment:     53   mins    Electronically signed    Tia Arana PTA  Physical Therapist Assistant    [employer] license: [id number]

## 2022-08-09 ENCOUNTER — TREATMENT (OUTPATIENT)
Dept: PHYSICAL THERAPY | Facility: CLINIC | Age: 52
End: 2022-08-09

## 2022-08-09 DIAGNOSIS — M54.6 THORACIC BACK PAIN, UNSPECIFIED BACK PAIN LATERALITY, UNSPECIFIED CHRONICITY: ICD-10-CM

## 2022-08-09 DIAGNOSIS — M50.30 DDD (DEGENERATIVE DISC DISEASE), CERVICAL: Primary | ICD-10-CM

## 2022-08-09 DIAGNOSIS — M25.511 RIGHT SHOULDER PAIN, UNSPECIFIED CHRONICITY: ICD-10-CM

## 2022-08-09 DIAGNOSIS — M25.551 RIGHT HIP PAIN: ICD-10-CM

## 2022-08-09 PROCEDURE — 97110 THERAPEUTIC EXERCISES: CPT | Performed by: PHYSICAL THERAPIST

## 2022-08-09 PROCEDURE — 97140 MANUAL THERAPY 1/> REGIONS: CPT | Performed by: PHYSICAL THERAPIST

## 2022-08-09 NOTE — PROGRESS NOTES
Physical Therapy Daily Treatment Note        Patient: Kalpana Marquez   : 1970  Diagnosis/ICD-10 Code:  DDD (degenerative disc disease), cervical [M50.30]  Referring practitioner: MARY Means  Date of Initial Visit: Type: THERAPY  Noted: 2022  Today's Date: 2022  Patient seen for 10 sessions             Subjective   Kalpana Marquez reports: still having pain on the right side. States that sometimes she is sore following PT session.     Objective   Minimal discomfort with Soft Tissue Massage to R parapsinals region.     See Exercise, Manual, and Modality Logs for complete treatment.       Assessment/Plan  Kalpana still experiencing increased low back, mid back R shoulder pain. Pt had some increased discomfort with Soft Tissue Massage to R paraspinal region. Pt would benefit from skilled PT to address Range of Motion  and Strength deficits, pain management and any concerns with ADLs.     Progress per Plan of Care           Timed:  Manual Therapy:    10     mins  55218;  Therapeutic Exercise:    20     mins  35507;     Neuromuscular Ann:        mins  47625;    Therapeutic Activity:          mins  10564;     Gait Training:           mins  81076;    Aquatic Therapy:          mins  13857;       Untimed:  Electrical Stimulation:         mins  79454 ( );  Mechanical Traction:         mins  75134;       Timed Treatment:   30   mins   Total Treatment:     30   mins      Electronically signed:   Loree Valenzuela PTA  Physical Therapist Assistant  [facility] License #: [id number]

## 2022-08-12 ENCOUNTER — TREATMENT (OUTPATIENT)
Dept: PHYSICAL THERAPY | Facility: CLINIC | Age: 52
End: 2022-08-12

## 2022-08-12 DIAGNOSIS — M54.6 THORACIC BACK PAIN, UNSPECIFIED BACK PAIN LATERALITY, UNSPECIFIED CHRONICITY: ICD-10-CM

## 2022-08-12 DIAGNOSIS — M50.30 DDD (DEGENERATIVE DISC DISEASE), CERVICAL: Primary | ICD-10-CM

## 2022-08-12 DIAGNOSIS — M25.511 RIGHT SHOULDER PAIN, UNSPECIFIED CHRONICITY: ICD-10-CM

## 2022-08-12 DIAGNOSIS — M25.551 RIGHT HIP PAIN: ICD-10-CM

## 2022-08-12 PROCEDURE — 97110 THERAPEUTIC EXERCISES: CPT | Performed by: PHYSICAL THERAPIST

## 2022-08-12 PROCEDURE — 97530 THERAPEUTIC ACTIVITIES: CPT | Performed by: PHYSICAL THERAPIST

## 2022-08-12 NOTE — PROGRESS NOTES
Physical Therapy Daily Treatment Note    Patient: Kalpana Marquez   : 1970  Diagnosis/ICD-10 Code:  DDD (degenerative disc disease), cervical [M50.30]  Referring practitioner: MARY Means  Date of Initial Visit: Type: THERAPY  Noted: 2022  Today's Date: 2022  Patient seen for 11 sessions           Subjective   The patient reported that she had a busy day at work yesterday and is sore today as a result. Most of her pain is located in her thoracic spine on the right side.    Objective   See Exercise, Manual, and Modality Logs for complete treatment.     Assessment/Plan  The patient demonstrated good tolerance to all functional shoulder strengthening exercises today. Continue to progress per patient tolerance.       Timed:  Manual Therapy:    6     mins  18929;  Therapeutic Exercise:    14     mins  34313;     Neuromuscular Ann:   0    mins  48071;    Therapeutic Activity:     8     mins  42657;     Gait Trainin     mins  21258;     Aquatics                         0      mins  69681    Un-timed:  Mechanical Traction      0     mins  37747  Dry Needling     0     mins self-pay  Electrical Stimulation:    0     mins  50403 ( );      Timed Treatment:   28   mins   Total Treatment:     28   mins    Rojas Davis PT  Physical Therapist    Electronically signed 2022    [place] License: PT - [id number]

## 2022-08-16 ENCOUNTER — TREATMENT (OUTPATIENT)
Dept: PHYSICAL THERAPY | Facility: CLINIC | Age: 52
End: 2022-08-16

## 2022-08-16 DIAGNOSIS — M50.30 DDD (DEGENERATIVE DISC DISEASE), CERVICAL: Primary | ICD-10-CM

## 2022-08-16 DIAGNOSIS — M25.511 RIGHT SHOULDER PAIN, UNSPECIFIED CHRONICITY: ICD-10-CM

## 2022-08-16 DIAGNOSIS — M54.6 THORACIC BACK PAIN, UNSPECIFIED BACK PAIN LATERALITY, UNSPECIFIED CHRONICITY: ICD-10-CM

## 2022-08-16 PROCEDURE — 97140 MANUAL THERAPY 1/> REGIONS: CPT | Performed by: PHYSICAL THERAPIST

## 2022-08-16 PROCEDURE — 97110 THERAPEUTIC EXERCISES: CPT | Performed by: PHYSICAL THERAPIST

## 2022-08-16 NOTE — PROGRESS NOTES
Physical Therapy Daily Treatment Note        Patient: Kalpana Marquez   : 1970  Diagnosis/ICD-10 Code:  DDD (degenerative disc disease), cervical [M50.30]  Referring practitioner: MARY Means  Date of Initial Visit: Type: THERAPY  Noted: 2022  Today's Date: 2022  Patient seen for 12 sessions             Subjective   Kalpana Marquez reports: back feeling a little better, states that she is still having shoulder pain and feels like her right hand is weak.     Objective   Increased discomfort with Soft Tissue Massage to R thoracic paraspinals.     See Exercise, Manual, and Modality Logs for complete treatment.       Assessment/Plan  Kalpana progressing as evident by decreased overall back  pain, although R shoulder still bothering her. Pt tolerated exercises and manual well, increased tenderness with STM to R thoracic paraspinals. Pt would benefit from skilled PT to address Range of Motion  and Strength deficits, pain management and any concerns with ADLs.       Progress per Plan of Care           Timed:  Manual Therapy:    15     mins  26891;  Therapeutic Exercise:    15     mins  33157;     Neuromuscular Ann:        mins  37653;    Therapeutic Activity:          mins  13596;     Gait Training:           mins  91742;    Aquatic Therapy:          mins  99737;       Untimed:  Electrical Stimulation:         mins  52614 ( );  Mechanical Traction:         mins  75427;       Timed Treatment:   30   mins   Total Treatment:     30   mins      Electronically signed:   Loree Valenzuela PTA  Physical Therapist Assistant  [place] [facility] License #: [id number]

## 2022-08-18 ENCOUNTER — TREATMENT (OUTPATIENT)
Dept: PHYSICAL THERAPY | Facility: CLINIC | Age: 52
End: 2022-08-18

## 2022-08-18 DIAGNOSIS — M54.6 THORACIC BACK PAIN, UNSPECIFIED BACK PAIN LATERALITY, UNSPECIFIED CHRONICITY: ICD-10-CM

## 2022-08-18 DIAGNOSIS — M25.551 RIGHT HIP PAIN: ICD-10-CM

## 2022-08-18 DIAGNOSIS — M25.511 RIGHT SHOULDER PAIN, UNSPECIFIED CHRONICITY: ICD-10-CM

## 2022-08-18 DIAGNOSIS — M50.30 DDD (DEGENERATIVE DISC DISEASE), CERVICAL: Primary | ICD-10-CM

## 2022-08-18 PROCEDURE — 97140 MANUAL THERAPY 1/> REGIONS: CPT | Performed by: PHYSICAL THERAPIST

## 2022-08-18 PROCEDURE — 97110 THERAPEUTIC EXERCISES: CPT | Performed by: PHYSICAL THERAPIST

## 2022-08-18 NOTE — PROGRESS NOTES
Physical Therapy Daily Treatment Note        Patient: Kalpana Marquez   : 1970  Diagnosis/ICD-10 Code:  DDD (degenerative disc disease), cervical [M50.30]  Referring practitioner: MARY Means  Date of Initial Visit: Type: THERAPY  Noted: 2022  Today's Date: 2022  Patient seen for 13 sessions             Subjective   Kalpana Marquez reports: back and shoulder feeling a little better, states that her biggest complain is her right forearm.     Objective   Increased discomfort with Soft Tissue Massage to R wrist extensors.     See Exercise, Manual, and Modality Logs for complete treatment.       Assessment/Plan  Kalpana progressing as evident by decreased overall shoulder and back  pain, although R forearm with hurts. Pt tolerated exercises and manual well, minimal discomfort with STM to R wrist extensors. Pt would benefit from skilled PT to address Range of Motion  and Strength deficits, pain management and any concerns with ADLs.       Progress per Plan of Care           Timed:  Manual Therapy:    15     mins  51119;  Therapeutic Exercise:    15     mins  92704;     Neuromuscular Ann:        mins  72723;    Therapeutic Activity:          mins  47651;     Gait Training:           mins  78452;    Aquatic Therapy:          mins  20318;       Untimed:  Electrical Stimulation:         mins  41051 ( );  Mechanical Traction:         mins  02338;       Timed Treatment:   30   mins   Total Treatment:     30   mins      Electronically signed:   Loree Valenzuela PTA  Physical Therapist Assistant  [place] [employer] License #: [id number]

## 2022-08-23 ENCOUNTER — OFFICE VISIT (OUTPATIENT)
Dept: FAMILY MEDICINE CLINIC | Facility: CLINIC | Age: 52
End: 2022-08-23

## 2022-08-23 VITALS
SYSTOLIC BLOOD PRESSURE: 116 MMHG | BODY MASS INDEX: 30.85 KG/M2 | WEIGHT: 163.4 LBS | OXYGEN SATURATION: 99 % | HEART RATE: 66 BPM | RESPIRATION RATE: 20 BRPM | HEIGHT: 61 IN | TEMPERATURE: 98 F | DIASTOLIC BLOOD PRESSURE: 84 MMHG

## 2022-08-23 DIAGNOSIS — R20.2 NUMBNESS AND TINGLING OF BOTH UPPER EXTREMITIES: ICD-10-CM

## 2022-08-23 DIAGNOSIS — R20.0 NUMBNESS AND TINGLING OF BOTH UPPER EXTREMITIES: ICD-10-CM

## 2022-08-23 DIAGNOSIS — G47.419 PRIMARY NARCOLEPSY WITHOUT CATAPLEXY: Primary | ICD-10-CM

## 2022-08-23 DIAGNOSIS — E53.8 B12 DEFICIENCY: ICD-10-CM

## 2022-08-23 DIAGNOSIS — R42 VERTIGO: ICD-10-CM

## 2022-08-23 PROCEDURE — 96372 THER/PROPH/DIAG INJ SC/IM: CPT

## 2022-08-23 PROCEDURE — 99214 OFFICE O/P EST MOD 30 MIN: CPT

## 2022-08-23 RX ORDER — CYANOCOBALAMIN 1000 UG/ML
1000 INJECTION, SOLUTION INTRAMUSCULAR; SUBCUTANEOUS ONCE
Status: COMPLETED | OUTPATIENT
Start: 2022-08-23 | End: 2022-08-23

## 2022-08-23 RX ORDER — MECLIZINE HCL 12.5 MG/1
12.5 TABLET ORAL 3 TIMES DAILY PRN
Qty: 90 TABLET | Refills: 1 | Status: SHIPPED | OUTPATIENT
Start: 2022-08-23 | End: 2022-10-11 | Stop reason: SDUPTHER

## 2022-08-23 RX ADMIN — CYANOCOBALAMIN 1000 MCG: 1000 INJECTION, SOLUTION INTRAMUSCULAR; SUBCUTANEOUS at 11:45

## 2022-08-23 NOTE — ASSESSMENT & PLAN NOTE
Question whether she could have some carpal tunnel syndrome or other pinched nerve, so we will order an EMG study to be performed to see what could be the cause.  Patient to continue to take gabapentin as needed for acute bouts of pain, she can also continue her meloxicam that she is been taking daily as well.  Can consider referral to Ortho/neurology based off results of EMG.  Has failed physical therapy.

## 2022-08-23 NOTE — ASSESSMENT & PLAN NOTE
Has had worsening symptoms, will send her to ENT for evaluation as she has never been evaluated for her vertigo.  We will also place her back on the meclizine to use as needed for acute attacks of vertigo.  Consider also physical therapy for the vertigo as well.

## 2022-08-23 NOTE — PROGRESS NOTES
Kalpana Marquez presents to [facility] who presents to clinic for 1 month follow-up.      History of Present Illness  This is a 52-year-old female who presents to clinic for 1 month follow-up.    Narcolepsy: Patient was diagnosed with this by sleep medicine with recent testing that had been done, she states that the phentermine that she was on was helping, and her sleep medicine provider told her that if she came off the phentermine she would place her on something to take daily to help with the daytime sleepiness.  States that she does feel better while being on the phentermine, Atlee she is got some answers, and is going to contact her sleep medicine provider once off the phentermine.    Vertigo: Patient states that she has a history of this, states that she typically will get some vertigo when she first wakes up in the morning, but will go away in about 30 minutes.  Patient states that about 2 weeks ago, she had a week long episode of just extreme dizziness and vertigo type symptoms.  Has never had this further evaluated, states that she did have some leftover medication, cannot member what this was called, but it did not help her symptoms.  Patient states that after that week, she has had it somewhat in the mornings, but nothing as severe as that.  She would like this further worked up, would like to be sent to a specialist and possibly have some physical therapy to help with this as well.  Denies any other associated symptoms, does not have any nausea when having the dizziness spells.    Patient also has complaints of bilateral arm pain, specifically right worse than left.  She states that she is got this shooting like pain that occurs, states that she has decreased  on both sides, right worse than left, and is wondering if the right is worse because that is the hand that she uses to do everything.  Patient states that this has been a persistent issue, has tried physical  "therapy to help with this, not getting much relief.  Is wondering what else could be causing this, would like some further work-up of this done to see with the causes.  Does state when she takes the gabapentin, is the only thing that really alleviates the pain completely.        The following portions of the patient's history were personally reviewed and updated as appropriate: allergies, current medications, past medical history, past surgical history, past family history, and past social history.       Objective   Vital Signs:   /84   Pulse 66   Temp 98 °F (36.7 °C)   Resp 20   Ht 154.9 cm (61\")   Wt 74.1 kg (163 lb 6.4 oz)   SpO2 99%   BMI 30.87 kg/m²     Body mass index is 30.87 kg/m².    All labs, imaging, test results, and specialty provider notes reviewed with patient.     Physical Exam  Vitals reviewed.   Constitutional:       Appearance: Normal appearance.   Cardiovascular:      Rate and Rhythm: Normal rate and regular rhythm.      Pulses: Normal pulses.      Heart sounds: Normal heart sounds.   Pulmonary:      Effort: Pulmonary effort is normal.      Breath sounds: Normal breath sounds.   Neurological:      General: No focal deficit present.      Mental Status: She is alert and oriented to person, place, and time.            Assessment and Plan:  Diagnoses and all orders for this visit:    1. Primary narcolepsy without cataplexy (Primary)  Assessment & Plan:  Per sleep medicine.  Did much better on phentermine, will take off phentermine in order for her to be placed on medication to treat the daytime sleepiness/narcolepsy.  Per sleep.      2. B12 deficiency  -     cyanocobalamin injection 1,000 mcg    3. Numbness and tingling of both upper extremities  Assessment & Plan:  Question whether she could have some carpal tunnel syndrome or other pinched nerve, so we will order an EMG study to be performed to see what could be the cause.  Patient to continue to take gabapentin as needed for acute " bouts of pain, she can also continue her meloxicam that she is been taking daily as well.  Can consider referral to Ortho/neurology based off results of EMG.  Has failed physical therapy.    Orders:  -     EMG 2 Limbs; Future    4. Vertigo  Assessment & Plan:  Has had worsening symptoms, will send her to ENT for evaluation as she has never been evaluated for her vertigo.  We will also place her back on the meclizine to use as needed for acute attacks of vertigo.  Consider also physical therapy for the vertigo as well.    Orders:  -     Ambulatory Referral to ENT (Otolaryngology)  -     meclizine (ANTIVERT) 12.5 MG tablet; Take 1 tablet by mouth 3 (Three) Times a Day As Needed for Dizziness.  Dispense: 90 tablet; Refill: 1        Follow Up:  Return in about 3 months (around 11/23/2022).    Patient was given instructions and counseling regarding her condition or for health maintenance advice. Please see specific information pulled into the AVS if appropriate.

## 2022-08-23 NOTE — ASSESSMENT & PLAN NOTE
Per sleep medicine.  Did much better on phentermine, will take off phentermine in order for her to be placed on medication to treat the daytime sleepiness/narcolepsy.  Per sleep.

## 2022-08-25 ENCOUNTER — TREATMENT (OUTPATIENT)
Dept: PHYSICAL THERAPY | Facility: CLINIC | Age: 52
End: 2022-08-25

## 2022-08-25 DIAGNOSIS — M54.6 THORACIC BACK PAIN, UNSPECIFIED BACK PAIN LATERALITY, UNSPECIFIED CHRONICITY: ICD-10-CM

## 2022-08-25 DIAGNOSIS — M25.511 RIGHT SHOULDER PAIN, UNSPECIFIED CHRONICITY: ICD-10-CM

## 2022-08-25 DIAGNOSIS — M25.551 RIGHT HIP PAIN: ICD-10-CM

## 2022-08-25 DIAGNOSIS — M50.30 DDD (DEGENERATIVE DISC DISEASE), CERVICAL: Primary | ICD-10-CM

## 2022-08-25 PROCEDURE — 97140 MANUAL THERAPY 1/> REGIONS: CPT | Performed by: PHYSICAL THERAPIST

## 2022-08-25 PROCEDURE — 97110 THERAPEUTIC EXERCISES: CPT | Performed by: PHYSICAL THERAPIST

## 2022-08-25 NOTE — PROGRESS NOTES
"Progress Note      Patient: Kalpana Marquez   : 1970  Diagnosis/ICD-10 Code:  DDD (degenerative disc disease), cervical [M50.30]  Referring practitioner: MARY Means  Date of Initial Visit: Type: THERAPY  Noted: 2022  Today's Date: 2022  Patient seen for 14 sessions      Subjective:   Subjective Questionnaire: QuickDASH:  = 43.18% Disability  Clinical Progress: improved  Home Program Compliance: Yes  Treatment has included: therapeutic exercise, manual therapy and therapeutic activity    Subjective   The patient reported that her pain is minimal today. Over the past month, she has noticed improvements in neck flexibility, shoulder pain, and shoulder strength. Her neck \"doesn't crunch\" every time that she turns it any more. She is still experiencing some radiating pain around her right shoulder blade and into her right arm, but she feels like it is improving. Her right hip is feeling quite a bit better. She would like to continue with PT to keep working on the pain in her right arm.    Objective          Tenderness     Additional Tenderness Details  Tenderness in right cervical paraspinals, UT, wrist extensors.    Neurological Testing     Additional Neurological Details  Sensation to light touch intact and equal bilaterally except for hypersensation on right at C7 dermatomal level    Active Range of Motion   Cervical/Thoracic Spine   Cervical    Flexion: 50 degrees   Extension: 45 degrees   Left lateral flexion: 32 degrees with pain  Right lateral flexion: 32 degrees with pain  Left rotation: 75 (pain on right) degrees   Right rotation: 75 degrees     Additional Active Range of Motion Details  Bilateral shoulder AROM  Grossly WFL  Lumbar AROM grossly WFL    Strength/Myotome Testing     Left Shoulder     Planes of Motion   Flexion: 4+   Abduction: 4+   External rotation at 0°: 5   Internal rotation at 0°: 5     Right Shoulder     Planes of Motion   Flexion: 4+   Abduction: 4+   External " rotation at 0°: 5   Internal rotation at 0°: 5     Left Elbow   Flexion: 5    Right Elbow   Flexion: 5    Left Wrist/Hand   Wrist extension: 5     (2nd hand position)     Trial 1: 45 lbs    Trial 2: 42 lbs    Trial 3: 47 lbs    Average: 44.67 lbs    Right Wrist/Hand   Wrist extension: 4+     (2nd hand position)     Trial 1: 38 lbs    Trial 2: 42 lbs    Trial 3: 40 lbs    Average: 40 lbs    Left Hip   Planes of Motion   Flexion: 4+  Extension: 4    Right Hip   Planes of Motion   Flexion: 4+  Extension: 4    Left Knee   Flexion: 5  Extension: 5    Right Knee   Flexion: 5  Extension: 5    Left Ankle/Foot   Dorsiflexion: 5    Right Ankle/Foot   Dorsiflexion: 5    Tests     Right Wrist/Hand   Negative Phalen's sign and Tinel's sign (medial nerve).       See Exercise, Manual, and Modality Logs for complete treatment.     Assessment & Plan     Assessment    Assessment details: The patient was re-evaluated today and presents with improvements in cervical ROM, hip strength, and function (QuickDASH) compared to her previous. She is still limited by moderate right sided cervical pain with radicular symptoms. She would benefit from continued skilled physical therapy to address remaining functional deficits and to allow the patient to return to her prior level of function.    Goals  Plan Goals: CERVICAL PROBLEMS    1. The patient has limited ROM.    LTG 1: 12 weeks:  The patient will demonstrate 75° of bilateral cervical spine rotation and 35° degrees of bilateral side bending in order to adequately monitor blind spots while driving and improve ability to perform activities of daily living.    STATUS:  Progressing  STG 1a: 6 weeks:  The patient will demonstrate 70° of bilateral cervical spine rotation and 25° degrees of bilateral side bending in order to adequately monitor blind spots while driving and improve ability to perform activities of daily living.      STATUS:  Met   TREATMENT:  Manual therapy, therapeutic  exercise, home exercise instruction, cervical traction, and modalities as needed to include: moist heat, electrical stimulation, and ultrasound.     2. The patient has complaints of pain.   LTG 2: 12 weeks:  The patient will report 2/10 pain in order to more easily tolerate activities of daily living and improve sleep quality.    STATUS:  Progressing   STG 2a: 6 weeks:  The patient will report 4/10 pain.    STATUS:  Met   TREATMENT: Manual therapy, therapeutic exercise, home exercise instruction, cervical traction, and modalities as needed to include: moist heat, electrical stimulation, and ultrasound.    3. The patient reports radicular symptoms in the right upper extremity.   LTG 3: 12 weeks:  The patient will report a decrease in radicular symptoms in the right upper extremity by 75%.    STATUS:  Progressing   STG 3a: 6 weeks:  The patient will report a decrease in radicular symptoms in the right upper extremity by 50%.    STATUS:  Progressing   TREATMENT: Manual therapy, therapeutic exercise, home exercise instruction, cervical traction, and modalities as needed to include: moist heat, electrical stimulation, and ultrasound.    4. Carrying, Moving, and Handling Objects Functional Limitation     LTG 4: 12 weeks:  The patient will demonstrate 25% limitation by achieving a score of 22/55 on the QuickDASH.    STATUS:  Progressing   STG 4a: 6 weeks:  The patient will demonstrate 50% limitation by achieving a score of 33/55 on the QuickDASH.      STATUS: Met   TREATMENT:  Manual therapy, therapeutic exercise, home exercise instruction, and modalities as needed to include: moist heat, electrical stimulation, and ultrasound.    5. The patient reports radicular symptoms in the right lower extremity.   LTG 5: 12 weeks:  The patient will report a decrease in radicular symptoms in the right lower extremity by 75%.    STATUS:  Met   STG 5a: 6 weeks:  The patient will report a decrease in radicular symptoms in the right lower  extremity by 50%.    STATUS:  Met   TREATMENT: Manual therapy, therapeutic exercise, home exercise instruction, cervical traction, and modalities as needed to include: moist heat, electrical stimulation, and ultrasound.    Plan  Therapy options: will be seen for skilled therapy services      Progress toward previous goals: Partially Met      Recommendations: Continue as planned  Timeframe: 1 month  Prognosis to achieve goals: good    PT Signature: Rojas Davis PT    Electronically signed 2022    [place] License: [id number] - [id number]       Timed:  Manual Therapy:    10     mins  55704;  Therapeutic Exercise:    10     mins  15177;     Neuromuscular Ann:    0    mins  41954;    Therapeutic Activity:     4     mins  91920;     Gait Trainin     mins  73896;     Aquatics                         0      mins  46519    Un-timed:  Mechanical Traction      0     mins  38947  Dry Needling     0     mins self-pay  Electrical Stimulation:    0     mins  76761 ( );    Timed Treatment:   24   mins   Total Treatment:     30   mins

## 2022-08-30 ENCOUNTER — OFFICE VISIT (OUTPATIENT)
Dept: SLEEP MEDICINE | Facility: HOSPITAL | Age: 52
End: 2022-08-30

## 2022-08-30 VITALS — HEART RATE: 79 BPM | HEIGHT: 61 IN | WEIGHT: 161.1 LBS | BODY MASS INDEX: 30.41 KG/M2 | OXYGEN SATURATION: 98 %

## 2022-08-30 DIAGNOSIS — G47.419 NARCOLEPSY WITHOUT CATAPLEXY: Primary | ICD-10-CM

## 2022-08-30 DIAGNOSIS — E66.9 OBESITY (BMI 30-39.9): ICD-10-CM

## 2022-08-30 PROCEDURE — G0463 HOSPITAL OUTPT CLINIC VISIT: HCPCS | Performed by: FAMILY MEDICINE

## 2022-08-30 PROCEDURE — 99214 OFFICE O/P EST MOD 30 MIN: CPT | Performed by: FAMILY MEDICINE

## 2022-08-30 RX ORDER — MODAFINIL 100 MG/1
TABLET ORAL
Qty: 45 TABLET | Refills: 1 | Status: SHIPPED | OUTPATIENT
Start: 2022-08-30

## 2022-08-30 NOTE — PROGRESS NOTES
[facility] Note     Chief Complaint: Narcolepsy without cataplexy    Primary Care Physician: Bozena Ballard APRN    Kalpana Marquez is a 52 y.o.female  was last seen at [facility] sleep lab: 7/26/2022.  PSG from July 2016 showed overall AHI 4.9 lowest SPO2 77% and MSLT showed mean sleep latency of 1 minute 1 second and 1 sleep onset REM.  Findings suggestive of idiopathic hypersomnia versus narcolepsy.  We reviewed sleep study results.  We decided to hold off on starting stimulant at that time because at the time she is completing treatment with phentermine.  The last time she took this stimulant with phentermine she had severe side effects.  She is advised to call us when she is off phentermine so we can try modafinil.    Since last visit has discontinued phentermine ready to start modafinil.    Current Medications:    Current Outpatient Medications:   •  amLODIPine (NORVASC) 10 MG tablet, TAKE 1 TABLET(10 MG) BY MOUTH EVERY DAY, Disp: 90 tablet, Rfl: 1  •  cyclobenzaprine (FLEXERIL) 5 MG tablet, Take 5 mg by mouth At Night As Needed., Disp: , Rfl:   •  diclofenac (VOLTAREN) 75 MG EC tablet, Take 75 mg by mouth Daily., Disp: , Rfl:   •  gabapentin (NEURONTIN) 300 MG capsule, Take 1 capsule by mouth 2 (two) times a day., Disp: 60 capsule, Rfl: 3  •  loratadine (CLARITIN) 10 MG tablet, Take 10 mg by mouth Every Morning., Disp: , Rfl:   •  meclizine (ANTIVERT) 12.5 MG tablet, Take 1 tablet by mouth 3 (Three) Times a Day As Needed for Dizziness., Disp: 90 tablet, Rfl: 1  •  meloxicam (MOBIC) 7.5 MG tablet, Take 7.5 mg by mouth Daily., Disp: , Rfl:   •  modafinil (PROVIGIL) 100 MG tablet, Take 1 tab QAM. In 2 weeks can inc to 2 tab QAM if needed for hypersomnia, Disp: 45 tablet, Rfl: 1  •  vitamin D (ERGOCALCIFEROL) 1.25 MG (26973 UT) capsule capsule, Take 1 capsule by mouth Every 7 (Seven) Days., Disp: 12 capsule, Rfl: 1    Current Facility-Administered Medications:   •  cyanocobalamin injection 1,000  "mcg, 1,000 mcg, Intramuscular, Q28 Days, Bozena Ballard APRN, 1,000 mcg at 22 1108   also entered in Sleep Questionnaire    Patient  has a past medical history of Allergic rhinitis, Arthritis, Constipation, Depression, Foot pain, Heel pain, Hypertension, Limb swelling, Numbness in feet, and Seasonal allergies.    Social History:    Social History     Socioeconomic History   • Marital status:    Tobacco Use   • Smoking status: Former Smoker     Packs/day: 0.50     Years: 25.00     Pack years: 12.50     Quit date: 8/3/2021     Years since quittin.0   • Smokeless tobacco: Never Used   • Tobacco comment: smoked 6-10 years, smokes hokah   Vaping Use   • Vaping Use: Never used   Substance and Sexual Activity   • Alcohol use: Never   • Drug use: Never     Comment: Current Some day       Allergies:  Latex    Vital Signs:    Vitals:    22 0900   Pulse: 79   SpO2: 98%   Weight: 73.1 kg (161 lb 1.6 oz)   Height: 154.9 cm (60.98\")     Body mass index is 30.46 kg/m².    REVIEW OF SYSTEMS.  Full review of systems available on the intake form which is scanned in the media tab.  The relevant positive are noted below  1. Daytime excessive sleepiness with [place] Sleepiness Scale :Total score: 9         Physical exam:  Vitals:    22 0900   Pulse: 79   SpO2: 98%   Weight: 73.1 kg (161 lb 1.6 oz)   Height: 154.9 cm (60.98\")    Body mass index is 30.46 kg/m².    HEENT: Head is atraumatic, normocephalic  Eyes: pupils are round equal and reacting to light and accommodation, conjunctiva normal  RESPIRATORY SYSTEM: Regular respirations  CARDIOVASULAR SYSTEM: Regular rate  EXTREMITES: No cyanosis, clubbing  NEUROLOGICAL SYSTEM: Oriented x 3, no gross motor defects, gait normal    Impression:  1. Narcolepsy without cataplexy    2. Obesity (BMI 30-39.9)      Start modafinil 100 mg every morning.  In 2 weeks can increase to 200 mg if needed for hypersomnia.  Most common side effect nausea and headache; " sometimes can resolve with time however if does not or unbearable can discontinue and call physician.  Any chest pain palpitation shortness of breath headache with vision changes, discontinue and call physician.  Return to clinic in 1 month for follow-up or sooner if needed.    Weight loss will be strongly beneficial to reduce the severity of sleep-disordered breathing.  Caution during activities that require prolonged concentration is strongly advised if sleepiness returns.     Yudi Mercado MD  Sleep Medicine  08/30/22  10:52 EDT

## 2022-09-06 RX ORDER — CYCLOBENZAPRINE HCL 5 MG
5 TABLET ORAL NIGHTLY PRN
Qty: 30 TABLET | Refills: 0 | Status: SHIPPED | OUTPATIENT
Start: 2022-09-06 | End: 2022-10-11 | Stop reason: SDUPTHER

## 2022-09-13 ENCOUNTER — TREATMENT (OUTPATIENT)
Dept: PHYSICAL THERAPY | Facility: CLINIC | Age: 52
End: 2022-09-13

## 2022-09-13 DIAGNOSIS — M25.511 RIGHT SHOULDER PAIN, UNSPECIFIED CHRONICITY: ICD-10-CM

## 2022-09-13 DIAGNOSIS — M54.6 THORACIC BACK PAIN, UNSPECIFIED BACK PAIN LATERALITY, UNSPECIFIED CHRONICITY: ICD-10-CM

## 2022-09-13 DIAGNOSIS — M50.30 DDD (DEGENERATIVE DISC DISEASE), CERVICAL: Primary | ICD-10-CM

## 2022-09-13 DIAGNOSIS — M25.551 RIGHT HIP PAIN: ICD-10-CM

## 2022-09-13 PROCEDURE — 97110 THERAPEUTIC EXERCISES: CPT | Performed by: PHYSICAL THERAPIST

## 2022-09-13 PROCEDURE — 97140 MANUAL THERAPY 1/> REGIONS: CPT | Performed by: PHYSICAL THERAPIST

## 2022-09-13 NOTE — PROGRESS NOTES
Physical Therapy Daily Treatment Note      Patient: Kalpana Marquez   : 1970  Referring practitioner: MARY Means  Date of Initial Visit: Type: THERAPY  Noted: 2022  Today's Date: 2022  Patient seen for 15 sessions           Subjective Questionnaire:       Subjective Evaluation    History of Present Illness    Subjective comment: Pt reports R forearm pain and lumbar pain.       Objective   See Exercise, Manual, and Modality Logs for complete treatment.       Assessment & Plan     Assessment    Assessment details: .Pt tolerated exercises and manual well to include STM to R wrist extensors. Pt tolerated lumbar strengthening exercise today. Continue with OC.        Visit Diagnoses:    ICD-10-CM ICD-9-CM   1. DDD (degenerative disc disease), cervical  M50.30 722.4   2. Right shoulder pain, unspecified chronicity  M25.511 719.41   3. Thoracic back pain, unspecified back pain laterality, unspecified chronicity  M54.6 724.1   4. Right hip pain  M25.551 719.45       Progress per Plan of Care and Progress strengthening /stabilization /functional activity           Timed:  Manual Therapy:    6     mins  91610;  Therapeutic Exercise:    22     mins  44660;     Neuromuscular Ann:        mins  65927;    Therapeutic Activity:          mins  20966;     Gait Training:           mins  33247;     Ultrasound:          mins  72241;    Electrical Stimulation:         mins  61390 ( );  Aquatic Therapy          mins  93893    Untimed:  Electrical Stimulation:         mins  67047 ( );  Mechanical Traction:         mins  45562;     Timed Treatment:   28   mins   Total Treatment:     28   mins    Electronically signed    Tia Arana PTA  Physical Therapist Assistant    [employer] license: [id number]

## 2022-09-20 ENCOUNTER — TREATMENT (OUTPATIENT)
Dept: PHYSICAL THERAPY | Facility: CLINIC | Age: 52
End: 2022-09-20

## 2022-09-20 DIAGNOSIS — M54.6 THORACIC BACK PAIN, UNSPECIFIED BACK PAIN LATERALITY, UNSPECIFIED CHRONICITY: ICD-10-CM

## 2022-09-20 DIAGNOSIS — M25.551 RIGHT HIP PAIN: ICD-10-CM

## 2022-09-20 DIAGNOSIS — M50.30 DDD (DEGENERATIVE DISC DISEASE), CERVICAL: Primary | ICD-10-CM

## 2022-09-20 DIAGNOSIS — M25.511 RIGHT SHOULDER PAIN, UNSPECIFIED CHRONICITY: ICD-10-CM

## 2022-09-20 PROCEDURE — 97110 THERAPEUTIC EXERCISES: CPT | Performed by: PHYSICAL THERAPIST

## 2022-09-20 PROCEDURE — 97140 MANUAL THERAPY 1/> REGIONS: CPT | Performed by: PHYSICAL THERAPIST

## 2022-09-20 NOTE — PROGRESS NOTES
Physical Therapy Daily Treatment Note      Patient: Kalpana Marquez   : 1970  Referring practitioner: MARY Means  Date of Initial Visit: Type: THERAPY  Noted: 2022  Today's Date: 2022  Patient seen for 16 sessions           Subjective Questionnaire:       Subjective Evaluation    History of Present Illness    Subjective comment: Pt presents today with c/o bad low back pain.  Pt reported low back pain was 11/10 and is still painful today.       Objective   See Exercise, Manual, and Modality Logs for complete treatment.       Assessment & Plan     Assessment    Assessment details: Pt presented to clinic with report of terrible low back pain.  Pt tolerated tx well and reported feeling better after tx.  Pt will benefit from continued core strengthening and cervical attention to relieve RUE pain.        Visit Diagnoses:    ICD-10-CM ICD-9-CM   1. DDD (degenerative disc disease), cervical  M50.30 722.4   2. Right shoulder pain, unspecified chronicity  M25.511 719.41   3. Thoracic back pain, unspecified back pain laterality, unspecified chronicity  M54.6 724.1   4. Right hip pain  M25.551 719.45       Progress per Plan of Care and Progress strengthening /stabilization /functional activity            Timed:  Manual Therapy:    20     mins  39996;  Therapeutic Exercise:    10     mins  19669;     Neuromuscular Ann:        mins  77638;    Therapeutic Activity:          mins  41897;     Gait Training:           mins  81587;     Ultrasound:          mins  82806;    Electrical Stimulation:         mins  10895 ( );  Aquatic Therapy          mins  66014    Untimed:  Electrical Stimulation:         mins  69207 ( );  Mechanical Traction:         mins  38999;     Timed Treatment:   30   mins   Total Treatment:     30   mins    Electronically signed    Tia Arana PTA  Physical Therapist Assistant    [employer] license: [id number]

## 2022-09-26 ENCOUNTER — TREATMENT (OUTPATIENT)
Dept: PHYSICAL THERAPY | Facility: CLINIC | Age: 52
End: 2022-09-26

## 2022-09-26 DIAGNOSIS — M25.511 RIGHT SHOULDER PAIN, UNSPECIFIED CHRONICITY: ICD-10-CM

## 2022-09-26 DIAGNOSIS — M50.30 DDD (DEGENERATIVE DISC DISEASE), CERVICAL: Primary | ICD-10-CM

## 2022-09-26 DIAGNOSIS — M54.6 THORACIC BACK PAIN, UNSPECIFIED BACK PAIN LATERALITY, UNSPECIFIED CHRONICITY: ICD-10-CM

## 2022-09-26 DIAGNOSIS — M25.551 RIGHT HIP PAIN: ICD-10-CM

## 2022-09-26 PROCEDURE — 97110 THERAPEUTIC EXERCISES: CPT | Performed by: PHYSICAL THERAPIST

## 2022-09-26 NOTE — PROGRESS NOTES
Progress Note / Discharge      Patient: Kalpana Marquez   : 1970  Diagnosis/ICD-10 Code:  DDD (degenerative disc disease), cervical [M50.30]  Referring practitioner: MARY Means  Date of Initial Visit: Type: THERAPY  Noted: 2022  Today's Date: 2022  Patient seen for 17 sessions      Subjective:   Subjective Questionnaire: QuickDASH:  = 36.36% Disability  Clinical Progress: improved  Home Program Compliance: Yes  Treatment has included: therapeutic exercise, manual therapy and therapeutic activity    Subjective   The patient reported that her neck pain level is a 4/10 today. Over the past month, she has noticed improvements in neck pain and shoulder strength. However, her back has really started bothering her again. She feels like something is out of place in her back. She is agreeable to discharge from PT. She will follow up with her PCP regarding further imaging for her lower back.    Objective          Tenderness     Additional Tenderness Details  Tenderness in right cervical paraspinals, UT, wrist extensors.    Neurological Testing     Additional Neurological Details  Sensation to light touch intact and equal bilaterally except for hypersensation on right at C7 dermatomal level    Active Range of Motion   Cervical/Thoracic Spine   Cervical    Flexion: 50 degrees   Extension: 45 degrees   Left lateral flexion: 32 degrees with pain  Right lateral flexion: 32 degrees with pain  Left rotation: 75 (pain on right) degrees   Right rotation: 75 degrees     Additional Active Range of Motion Details  Bilateral shoulder AROM  Grossly WFL  Lumbar AROM grossly WFL    Strength/Myotome Testing     Left Shoulder     Planes of Motion   Flexion: 4+   Abduction: 4+   External rotation at 0°: 5   Internal rotation at 0°: 5     Right Shoulder     Planes of Motion   Flexion: 4+   Abduction: 4+   External rotation at 0°: 5   Internal rotation at 0°: 5     Left Elbow   Flexion: 5    Right Elbow   Flexion:  5    Left Wrist/Hand   Wrist extension: 5     (2nd hand position)     Trial 1: 45 lbs    Trial 2: 42 lbs    Trial 3: 47 lbs    Average: 44.67 lbs    Right Wrist/Hand   Wrist extension: 4+     (2nd hand position)     Trial 1: 38 lbs    Trial 2: 42 lbs    Trial 3: 40 lbs    Average: 40 lbs    Left Hip   Planes of Motion   Flexion: 4+  Extension: 4    Right Hip   Planes of Motion   Flexion: 4+  Extension: 4    Left Knee   Flexion: 5  Extension: 5    Right Knee   Flexion: 5  Extension: 5    Left Ankle/Foot   Dorsiflexion: 5    Right Ankle/Foot   Dorsiflexion: 5    Tests     Right Wrist/Hand   Negative Phalen's sign and Tinel's sign (medial nerve).       See Exercise, Manual, and Modality Logs for complete treatment.     Assessment & Plan     Assessment    Assessment details: The patient was re-evaluated today and presents with improvements in cervical ROM and function (QuickDASH) compared to her previous assessment. Her back is bothering her more today than her neck. She is appropriate for discharge from PT at this time due to a plateau in progress. She will follow up with her PCP regarding further imaging of her low back.    Goals  Plan Goals: CERVICAL PROBLEMS    1. The patient has limited ROM.    LTG 1: 12 weeks:  The patient will demonstrate 75° of bilateral cervical spine rotation and 35° degrees of bilateral side bending in order to adequately monitor blind spots while driving and improve ability to perform activities of daily living.    STATUS:  Not met  STG 1a: 6 weeks:  The patient will demonstrate 70° of bilateral cervical spine rotation and 25° degrees of bilateral side bending in order to adequately monitor blind spots while driving and improve ability to perform activities of daily living.      STATUS:  Met   TREATMENT:  Manual therapy, therapeutic exercise, home exercise instruction, cervical traction, and modalities as needed to include: moist heat, electrical stimulation, and ultrasound.     2.  The patient has complaints of pain.   LTG 2: 12 weeks:  The patient will report 2/10 pain in order to more easily tolerate activities of daily living and improve sleep quality.    STATUS:  Not met   STG 2a: 6 weeks:  The patient will report 4/10 pain.    STATUS:  Met   TREATMENT: Manual therapy, therapeutic exercise, home exercise instruction, cervical traction, and modalities as needed to include: moist heat, electrical stimulation, and ultrasound.    3. The patient reports radicular symptoms in the right upper extremity.   LTG 3: 12 weeks:  The patient will report a decrease in radicular symptoms in the right upper extremity by 75%.    STATUS:  Met   STG 3a: 6 weeks:  The patient will report a decrease in radicular symptoms in the right upper extremity by 50%.    STATUS:  Met   TREATMENT: Manual therapy, therapeutic exercise, home exercise instruction, cervical traction, and modalities as needed to include: moist heat, electrical stimulation, and ultrasound.    4. Carrying, Moving, and Handling Objects Functional Limitation     LTG 4: 12 weeks:  The patient will demonstrate 25% limitation by achieving a score of 22/55 on the QuickDASH.    STATUS:  Progressing, not met   STG 4a: 6 weeks:  The patient will demonstrate 50% limitation by achieving a score of 33/55 on the QuickDASH.      STATUS: Met   TREATMENT:  Manual therapy, therapeutic exercise, home exercise instruction, and modalities as needed to include: moist heat, electrical stimulation, and ultrasound.    5. The patient reports radicular symptoms in the right lower extremity.   LTG 5: 12 weeks:  The patient will report a decrease in radicular symptoms in the right lower extremity by 75%.    STATUS:  Met   STG 5a: 6 weeks:  The patient will report a decrease in radicular symptoms in the right lower extremity by 50%.    STATUS:  Met   TREATMENT: Manual therapy, therapeutic exercise, home exercise instruction, cervical traction, and modalities as needed to  include: moist heat, electrical stimulation, and ultrasound.    Plan  Therapy options: will not be seen for skilled therapy services      Progress toward previous goals: Partially Met      Recommendations: Discharge    Prognosis to achieve goals: fair    PT Signature: Rojas Davis PT    Electronically signed 2022    [place] License: PT - [id number]       Timed:  Manual Therapy:    0     mins  40809;  Therapeutic Exercise:    10     mins  20262;     Neuromuscular Ann:    0    mins  85716;    Therapeutic Activity:     0     mins  73216;     Gait Trainin     mins  29831;     Aquatics                         0      mins  40618    Un-timed:  Mechanical Traction      0     mins  06427  Dry Needling     0     mins self-pay  Electrical Stimulation:    0     mins  58134 ( );    Timed Treatment:   10   mins   Total Treatment:     15   mins

## 2022-09-29 DIAGNOSIS — I10 HYPERTENSION, UNSPECIFIED TYPE: ICD-10-CM

## 2022-09-29 RX ORDER — AMLODIPINE BESYLATE 10 MG/1
10 TABLET ORAL DAILY
Qty: 90 TABLET | Refills: 1 | Status: SHIPPED | OUTPATIENT
Start: 2022-09-29 | End: 2022-10-11 | Stop reason: SDUPTHER

## 2022-09-29 NOTE — TELEPHONE ENCOUNTER
Caller: Kalpana Marquze    Relationship: Self    Best call back number: [phone]    Requested Prescriptions:   Requested Prescriptions     Pending Prescriptions Disp Refills   • amLODIPine (NORVASC) 10 MG tablet 90 tablet 1     Sig: Take 1 tablet by mouth Daily.        Pharmacy where request should be sent[id number] [place] [facility] [address] [facility] [address] [facility] [phone] [facility] [phone] FX     Does the patient have less than a 3 day supply:  [x] Yes  [] No    Erica Schroeder Rep   09/29/22 14:20 EDT

## 2022-10-11 ENCOUNTER — OFFICE VISIT (OUTPATIENT)
Dept: FAMILY MEDICINE CLINIC | Facility: CLINIC | Age: 52
End: 2022-10-11

## 2022-10-11 VITALS
WEIGHT: 163.8 LBS | SYSTOLIC BLOOD PRESSURE: 120 MMHG | TEMPERATURE: 97.5 F | BODY MASS INDEX: 30.93 KG/M2 | HEIGHT: 61 IN | OXYGEN SATURATION: 99 % | HEART RATE: 85 BPM | DIASTOLIC BLOOD PRESSURE: 80 MMHG

## 2022-10-11 DIAGNOSIS — E66.9 OBESITY (BMI 30-39.9): ICD-10-CM

## 2022-10-11 DIAGNOSIS — E55.9 VITAMIN D DEFICIENCY: ICD-10-CM

## 2022-10-11 DIAGNOSIS — M17.0 PRIMARY OSTEOARTHRITIS OF BOTH KNEES: Primary | ICD-10-CM

## 2022-10-11 DIAGNOSIS — G47.419 NARCOLEPSY WITHOUT CATAPLEXY: ICD-10-CM

## 2022-10-11 DIAGNOSIS — M50.30 DDD (DEGENERATIVE DISC DISEASE), CERVICAL: ICD-10-CM

## 2022-10-11 DIAGNOSIS — I10 HYPERTENSION, UNSPECIFIED TYPE: ICD-10-CM

## 2022-10-11 DIAGNOSIS — M54.6 THORACIC BACK PAIN, UNSPECIFIED BACK PAIN LATERALITY, UNSPECIFIED CHRONICITY: ICD-10-CM

## 2022-10-11 DIAGNOSIS — J30.2 SEASONAL ALLERGIC RHINITIS, UNSPECIFIED TRIGGER: ICD-10-CM

## 2022-10-11 DIAGNOSIS — R42 VERTIGO: ICD-10-CM

## 2022-10-11 PROCEDURE — 99214 OFFICE O/P EST MOD 30 MIN: CPT

## 2022-10-11 RX ORDER — AMLODIPINE BESYLATE 10 MG/1
10 TABLET ORAL DAILY
Qty: 90 TABLET | Refills: 1 | Status: SHIPPED | OUTPATIENT
Start: 2022-10-11

## 2022-10-11 RX ORDER — MECLIZINE HCL 12.5 MG/1
12.5 TABLET ORAL 3 TIMES DAILY PRN
Qty: 90 TABLET | Refills: 1 | Status: SHIPPED | OUTPATIENT
Start: 2022-10-11

## 2022-10-11 RX ORDER — ERGOCALCIFEROL 1.25 MG/1
50000 CAPSULE ORAL
Qty: 12 CAPSULE | Refills: 1 | Status: SHIPPED | OUTPATIENT
Start: 2022-10-11

## 2022-10-11 RX ORDER — GABAPENTIN 300 MG/1
300 CAPSULE ORAL 3 TIMES DAILY
Qty: 60 CAPSULE | Refills: 3 | Status: SHIPPED | OUTPATIENT
Start: 2022-10-11 | End: 2022-11-22

## 2022-10-11 RX ORDER — CYCLOBENZAPRINE HCL 5 MG
5 TABLET ORAL NIGHTLY PRN
Qty: 30 TABLET | Refills: 0 | Status: SHIPPED | OUTPATIENT
Start: 2022-10-11 | End: 2022-11-22

## 2022-10-11 RX ORDER — LORATADINE 10 MG/1
10 TABLET ORAL EVERY MORNING
Qty: 90 TABLET | Refills: 1 | Status: SHIPPED | OUTPATIENT
Start: 2022-10-11

## 2022-10-11 RX ORDER — MELOXICAM 7.5 MG/1
7.5 TABLET ORAL DAILY
Qty: 30 TABLET | Refills: 1 | Status: CANCELLED | OUTPATIENT
Start: 2022-10-11

## 2022-10-11 RX ORDER — DICLOFENAC SODIUM 75 MG/1
75 TABLET, DELAYED RELEASE ORAL DAILY
Qty: 30 TABLET | Refills: 0 | Status: CANCELLED | OUTPATIENT
Start: 2022-10-11

## 2022-10-11 RX ORDER — MODAFINIL 100 MG/1
TABLET ORAL
Qty: 45 TABLET | Refills: 1 | Status: CANCELLED | OUTPATIENT
Start: 2022-10-11

## 2022-10-11 NOTE — PROGRESS NOTES
"Kalpana Marquez presents to [facility] who presents with needing medical clearance for upcoming procedures overseas.       History of Present Illness  This is a 52 year old female who presents to the clinic today for medical clearance for upcoming procedures overseas.     Patient is planning on flying to [place] to have dental procedure and botox performed. She states she needs a letter medically clearing her for these procedures.     Hypertension: currently stable. Doing well. Denies chest pain/SOB. On amlodipine.     DDD, cervical: Doing well after PT. Improving with associated improvement in neuropathy. No longer takes gabapentin, or anti-inflammatories. Does take muscle relaxer as needed.     Allergic rhinitis: Doing well. Stable. Remains on Claritin.     No other acute issues at this time.     The following portions of the patient's history were personally reviewed and updated as appropriate: allergies, current medications, past medical history, past surgical history, past family history, and past social history.       Objective   Vital Signs:   /80 (BP Location: Right arm, Patient Position: Sitting, Cuff Size: Adult)   Pulse 85   Temp 97.5 °F (36.4 °C) (Temporal)   Ht 154.9 cm (60.98\")   Wt 74.3 kg (163 lb 12.8 oz)   SpO2 99%   BMI 30.97 kg/m²     Body mass index is 30.97 kg/m².    All labs, imaging, test results, and specialty provider notes reviewed with patient.     Physical Exam  Vitals reviewed.   Constitutional:       Appearance: Normal appearance.   Cardiovascular:      Rate and Rhythm: Normal rate and regular rhythm.      Pulses: Normal pulses.      Heart sounds: Normal heart sounds.   Pulmonary:      Effort: Pulmonary effort is normal.      Breath sounds: Normal breath sounds.   Neurological:      General: No focal deficit present.      Mental Status: She is alert and oriented to person, place, and time.            Assessment and Plan:  Diagnoses and all orders " for this visit:    1. Primary osteoarthritis of both knees (Primary)    2. Hypertension, unspecified type  Comments:  Stable. Continue on amlodipine  Orders:  -     amLODIPine (NORVASC) 10 MG tablet; Take 1 tablet by mouth Daily.  Dispense: 90 tablet; Refill: 1    3. Vitamin D deficiency  Comments:  Stable. Remain on supplement at this time.   Orders:  -     vitamin D (ERGOCALCIFEROL) 1.25 MG (56835 UT) capsule capsule; Take 1 capsule by mouth Every 7 (Seven) Days.  Dispense: 12 capsule; Refill: 1    4. Thoracic back pain, unspecified back pain laterality, unspecified chronicity  -     cyclobenzaprine (FLEXERIL) 5 MG tablet; Take 1 tablet by mouth At Night As Needed (pain).  Dispense: 30 tablet; Refill: 0  -     gabapentin (NEURONTIN) 300 MG capsule; Take 1 capsule by mouth 3 (Three) Times a Day.  Dispense: 60 capsule; Refill: 3    5. Vertigo  -     meclizine (ANTIVERT) 12.5 MG tablet; Take 1 tablet by mouth 3 (Three) Times a Day As Needed for Dizziness.  Dispense: 90 tablet; Refill: 1    6. Narcolepsy without cataplexy    7. Obesity (BMI 30-39.9)    8. DDD (degenerative disc disease), cervical  -     cyclobenzaprine (FLEXERIL) 5 MG tablet; Take 1 tablet by mouth At Night As Needed (pain).  Dispense: 30 tablet; Refill: 0    9. Seasonal allergic rhinitis, unspecified trigger  -     loratadine (CLARITIN) 10 MG tablet; Take 1 tablet by mouth Every Morning.  Dispense: 90 tablet; Refill: 1      Do think patient is medically clear for dental and botox procedures. Gave written letter with list of medications.     Follow Up:  No follow-ups on file.    Patient was given instructions and counseling regarding her condition or for health maintenance advice. Please see specific information pulled into the AVS if appropriate.

## 2022-11-22 ENCOUNTER — OFFICE VISIT (OUTPATIENT)
Dept: FAMILY MEDICINE CLINIC | Facility: CLINIC | Age: 52
End: 2022-11-22

## 2022-11-22 VITALS
TEMPERATURE: 98 F | BODY MASS INDEX: 30.38 KG/M2 | WEIGHT: 160.9 LBS | DIASTOLIC BLOOD PRESSURE: 86 MMHG | OXYGEN SATURATION: 98 % | RESPIRATION RATE: 19 BRPM | HEIGHT: 61 IN | SYSTOLIC BLOOD PRESSURE: 112 MMHG | HEART RATE: 76 BPM

## 2022-11-22 DIAGNOSIS — R42 VERTIGO: ICD-10-CM

## 2022-11-22 DIAGNOSIS — G47.419 NARCOLEPSY WITHOUT CATAPLEXY: ICD-10-CM

## 2022-11-22 DIAGNOSIS — M54.6 THORACIC BACK PAIN, UNSPECIFIED BACK PAIN LATERALITY, UNSPECIFIED CHRONICITY: ICD-10-CM

## 2022-11-22 DIAGNOSIS — I10 PRIMARY HYPERTENSION: Primary | ICD-10-CM

## 2022-11-22 PROCEDURE — 99214 OFFICE O/P EST MOD 30 MIN: CPT

## 2022-11-22 PROCEDURE — 96372 THER/PROPH/DIAG INJ SC/IM: CPT

## 2022-11-22 RX ADMIN — CYANOCOBALAMIN 1000 MCG: 1000 INJECTION, SOLUTION INTRAMUSCULAR; SUBCUTANEOUS at 09:39

## 2022-11-22 NOTE — ASSESSMENT & PLAN NOTE
Has had recent episode of vertigo, continue on meclizine.  Patient does have upcoming appointment with ENT for further evaluation of this as well.

## 2022-11-22 NOTE — PROGRESS NOTES
Kalpana Marquez presents to [facility] who presents to clinic for 3-month follow-up.      History of Present Illness  This is a 52-year-old female who presents to the clinic for 3-month follow-up.    Vertigo: States that she is actually done really well, just recently got back from a trip to [place], states that she did not have any issues over there.  Did not take any of her medications besides her blood pressure medication, states that she felt really great over there.  Did have an episode of vertigo last night, states that she just got back on Friday, but did have an episode where she was pretty dizzy.  Does have upcoming appointment with ENT, has started back on her meclizine.    Narcolepsy: States that she is actually been doing really well without any medication, states that she never got the modafinil as it was never approved by her insurance and was good to cost her $2-$3000.  States that she is actually had a lot more energy, does not feel like she needs it, does follow-up with sleep therapy after the beginning of the year.  No acute issues with this either.    Vitamin B12 deficiency: Did get injection today, has been only getting it about every 3 months, but has made it pretty big difference.    Hypertension: Stable.  Patient's blood pressure today is 112/86.  Doing well on the amlodipine, no chest pain or shortness of breath.    Neck/back pain: States that her symptoms have improved pretty drastically after going through physical therapy, no longer takes the gabapentin, diclofenac, meloxicam, or cyclobenzaprine.  States that she is only taking her blood pressure medication on a routine basis.    Has no other acute issues at this time.    The following portions of the patient's history were personally reviewed and updated as appropriate: allergies, current medications, past medical history, past surgical history, past family history, and past social history.       Objective  "  Vital Signs:   /86   Pulse 76   Temp 98 °F (36.7 °C)   Resp 19   Ht 154.9 cm (60.98\")   Wt 73 kg (160 lb 14.4 oz)   SpO2 98%   BMI 30.42 kg/m²     Body mass index is 30.42 kg/m².    All labs, imaging, test results, and specialty provider notes reviewed with patient.     Physical Exam  Vitals reviewed.   Constitutional:       Appearance: Normal appearance.   Cardiovascular:      Rate and Rhythm: Normal rate and regular rhythm.      Pulses: Normal pulses.      Heart sounds: Normal heart sounds.   Pulmonary:      Effort: Pulmonary effort is normal.      Breath sounds: Normal breath sounds.   Neurological:      General: No focal deficit present.      Mental Status: She is alert and oriented to person, place, and time.                Assessment and Plan:  Diagnoses and all orders for this visit:    1. Primary hypertension (Primary)  Assessment & Plan:  Stable.  Continue amlodipine at current dose.  Discussed lifestyle modifications as well.      2. Thoracic back pain, unspecified back pain laterality, unspecified chronicity  Assessment & Plan:  No current issues, continue to monitor.  No longer taking medications for this either.      3. Vertigo  Assessment & Plan:  Has had recent episode of vertigo, continue on meclizine.  Patient does have upcoming appointment with ENT for further evaluation of this as well.      4. Narcolepsy without cataplexy  Assessment & Plan:  Doing well without any medication, continue with follow-up via sleep medicine, further treatment/management per them.          Follow Up:  Return in about 5 months (around 4/22/2023).    Patient was given instructions and counseling regarding her condition or for health maintenance advice. Please see specific information pulled into the AVS if appropriate.       "

## 2022-11-22 NOTE — ASSESSMENT & PLAN NOTE
Doing well without any medication, continue with follow-up via sleep medicine, further treatment/management per them.

## 2022-11-29 ENCOUNTER — OFFICE VISIT (OUTPATIENT)
Dept: OTOLARYNGOLOGY | Facility: CLINIC | Age: 52
End: 2022-11-29

## 2022-11-29 ENCOUNTER — PATIENT ROUNDING (BHMG ONLY) (OUTPATIENT)
Dept: OTOLARYNGOLOGY | Facility: CLINIC | Age: 52
End: 2022-11-29

## 2022-11-29 VITALS — HEIGHT: 61 IN | WEIGHT: 161.2 LBS | BODY MASS INDEX: 30.43 KG/M2 | TEMPERATURE: 97 F

## 2022-11-29 DIAGNOSIS — H81.10 BENIGN PAROXYSMAL POSITIONAL VERTIGO, UNSPECIFIED LATERALITY: Primary | ICD-10-CM

## 2022-11-29 PROCEDURE — 99213 OFFICE O/P EST LOW 20 MIN: CPT | Performed by: NURSE PRACTITIONER

## 2022-11-29 NOTE — PROGRESS NOTES
November 29, 2022    Hello, may I speak with Kalpana Marquez?    My name is Ema      I am  with [facility]  [address]  [place] [address] [phone].    Before we get started may I verify your date of birth? 1970    I am calling to officially welcome you to our practice and ask about your recent visit. Is this a good time to talk? yes    Tell me about your visit with us. What things went well?  went good       We're always looking for ways to make our patients' experiences even better. Do you have recommendations on ways we may improve?  no    Overall were you satisfied with your first visit to our practice? yes       I appreciate you taking the time to speak with me today. Is there anything else I can do for you? no      Thank you, and have a great day.

## 2022-11-29 NOTE — PROGRESS NOTES
Patient Name: Kalpana Marquez   Visit Date: 11/29/2022   Patient ID: [id number]  Provider: MARY Sadler    Sex: female  Location: [facility]   YOB: 1970  Location Address: [address] [place] [address], [address],?[place]?[address]    Primary Care Provider Bozena Ballard APRN  Location Phone: [phone]    Referring Provider: MARY Means        Chief Complaint  Dizziness    Subjective   Kalpana Marquez is a 52 y.o. female who presents to [facility] today as a consult from MARY Means for evaluation of recurrent vertigo.  Patient states this is been ongoing for the past 5 or 6 years.  She states she will go sometimes for 5 months without experiencing any vertigo and then will start again.  She states she was referred here after experiencing vertigo for about a week straight.  She states [place] went out of the country [place] a month and did not experience any vertigo.  She states she did have 1 small episode of vertigo last Monday when laying in bed.  She states she will get a room spinning sensation that would last for few seconds.  She states she will have to close her eyes and this will resolve.  She denies any issues with hearing loss, tinnitus or ear fullness.  She states she takes meclizine as needed which is helpful.      Current Outpatient Medications on File Prior to Visit   Medication Sig   • amLODIPine (NORVASC) 10 MG tablet Take 1 tablet by mouth Daily.   • loratadine (CLARITIN) 10 MG tablet Take 1 tablet by mouth Every Morning.   • meclizine (ANTIVERT) 12.5 MG tablet Take 1 tablet by mouth 3 (Three) Times a Day As Needed for Dizziness.   • modafinil (PROVIGIL) 100 MG tablet Take 1 tab QAM. In 2 weeks can inc to 2 tab QAM if needed for hypersomnia   • vitamin D (ERGOCALCIFEROL) 1.25 MG (22837 UT) capsule capsule Take 1 capsule by mouth Every 7 (Seven) Days.     Current Facility-Administered Medications on File Prior to  "Visit   Medication   • cyanocobalamin injection 1,000 mcg        Social History     Tobacco Use   • Smoking status: Former     Packs/day: 0.50     Years: 25.00     Pack years: 12.50     Types: Cigarettes     Quit date: 8/3/2021     Years since quittin.3   • Smokeless tobacco: Never   • Tobacco comments:     smoked 6-10 years, smokes [place]   Vaping Use   • Vaping Use: Never used   Substance Use Topics   • Alcohol use: Never   • Drug use: Never     Comment: Current Some day       Objective     Vital Signs:   Temp 97 °F (36.1 °C) (Temporal)   Ht 154.9 cm (60.98\")   Wt 73.1 kg (161 lb 3.2 oz)   BMI 30.48 kg/m²       Physical Exam  Constitutional:       General: She is not in acute distress.     Appearance: Normal appearance. She is not ill-appearing.   HENT:      Head: Normocephalic and atraumatic.      Jaw: There is normal jaw occlusion. No tenderness or pain on movement.      Salivary Glands: Right salivary gland is not diffusely enlarged or tender. Left salivary gland is not diffusely enlarged or tender.      Comments: Incisions across the eyelids, postauricular on both sides from recent surgery     Right Ear: Tympanic membrane, ear canal and external ear normal.      Left Ear: Tympanic membrane, ear canal and external ear normal.      Nose: Nose normal. No septal deviation.      Right Sinus: No maxillary sinus tenderness or frontal sinus tenderness.      Left Sinus: No maxillary sinus tenderness or frontal sinus tenderness.      Mouth/Throat:      Lips: Pink. No lesions.      Mouth: Mucous membranes are moist. No oral lesions.      Dentition: Normal dentition.      Tongue: No lesions.      Palate: No mass and lesions.      Pharynx: Oropharynx is clear. Uvula midline.      Tonsils: No tonsillar exudate.   Eyes:      Extraocular Movements: Extraocular movements intact.      Conjunctiva/sclera: Conjunctivae normal.      Pupils: Pupils are equal, round, and reactive to light.   Neck:      Thyroid: No thyroid " mass, thyromegaly or thyroid tenderness.      Trachea: Trachea normal.   Pulmonary:      Effort: Pulmonary effort is normal. No respiratory distress.   Musculoskeletal:         General: Normal range of motion.      Cervical back: Normal range of motion and neck supple. No tenderness.   Lymphadenopathy:      Cervical: No cervical adenopathy.   Skin:     General: Skin is warm and dry.   Neurological:      General: No focal deficit present.      Mental Status: She is alert and oriented to person, place, and time.      Cranial Nerves: No cranial nerve deficit.      Motor: No weakness.      Gait: Gait normal.   Psychiatric:         Mood and Affect: Mood normal.         Behavior: Behavior normal.         Thought Content: Thought content normal.         Judgment: Judgment normal.               Result Review :              Assessment and Plan    Diagnoses and all orders for this visit:    1. Benign paroxysmal positional vertigo, unspecified laterality (Primary)  -     Ambulatory Referral to Physical Therapy Evaluate and treat           Follow Up   No follow-ups on file.  Patient was given instructions and counseling regarding her condition or for health maintenance advice. Please see specific information pulled into the AVS if appropriate.      MARY Sadler

## 2023-02-27 ENCOUNTER — TRANSCRIBE ORDERS (OUTPATIENT)
Dept: ADMINISTRATIVE | Facility: HOSPITAL | Age: 53
End: 2023-02-27
Payer: COMMERCIAL

## 2023-02-27 DIAGNOSIS — Z92.89 HISTORY OF MAMMOGRAPHY, SCREENING: Primary | ICD-10-CM

## 2023-03-02 ENCOUNTER — CLINICAL SUPPORT (OUTPATIENT)
Dept: FAMILY MEDICINE CLINIC | Facility: CLINIC | Age: 53
End: 2023-03-02
Payer: COMMERCIAL

## 2023-03-02 DIAGNOSIS — E53.8 B12 DEFICIENCY: ICD-10-CM

## 2023-03-02 PROCEDURE — 96372 THER/PROPH/DIAG INJ SC/IM: CPT

## 2023-03-02 RX ADMIN — CYANOCOBALAMIN 1000 MCG: 1000 INJECTION, SOLUTION INTRAMUSCULAR; SUBCUTANEOUS at 15:09

## 2023-04-25 ENCOUNTER — OFFICE VISIT (OUTPATIENT)
Dept: FAMILY MEDICINE CLINIC | Facility: CLINIC | Age: 53
End: 2023-04-25
Payer: COMMERCIAL

## 2023-04-25 VITALS
OXYGEN SATURATION: 98 % | HEIGHT: 61 IN | RESPIRATION RATE: 19 BRPM | SYSTOLIC BLOOD PRESSURE: 120 MMHG | WEIGHT: 167.5 LBS | BODY MASS INDEX: 31.63 KG/M2 | HEART RATE: 85 BPM | DIASTOLIC BLOOD PRESSURE: 78 MMHG

## 2023-04-25 DIAGNOSIS — E53.8 B12 DEFICIENCY: ICD-10-CM

## 2023-04-25 DIAGNOSIS — Z13.220 SCREENING FOR LIPID DISORDERS: ICD-10-CM

## 2023-04-25 DIAGNOSIS — G62.9 NEUROPATHY: ICD-10-CM

## 2023-04-25 DIAGNOSIS — E55.9 VITAMIN D DEFICIENCY: ICD-10-CM

## 2023-04-25 DIAGNOSIS — M54.50 LOW BACK PAIN, UNSPECIFIED BACK PAIN LATERALITY, UNSPECIFIED CHRONICITY, UNSPECIFIED WHETHER SCIATICA PRESENT: ICD-10-CM

## 2023-04-25 DIAGNOSIS — G47.419 PRIMARY NARCOLEPSY WITHOUT CATAPLEXY: ICD-10-CM

## 2023-04-25 DIAGNOSIS — M51.36 DDD (DEGENERATIVE DISC DISEASE), LUMBAR: Primary | ICD-10-CM

## 2023-04-25 DIAGNOSIS — I10 PRIMARY HYPERTENSION: ICD-10-CM

## 2023-04-25 RX ORDER — MONTELUKAST SODIUM 10 MG/1
10 TABLET ORAL
COMMUNITY
Start: 2023-04-05

## 2023-04-25 RX ORDER — NICOTINE POLACRILEX 2 MG
LOZENGE BUCCAL
COMMUNITY
Start: 2023-04-05

## 2023-04-25 RX ORDER — METHYLPREDNISOLONE ACETATE 40 MG/ML
40 INJECTION, SUSPENSION INTRA-ARTICULAR; INTRALESIONAL; INTRAMUSCULAR; SOFT TISSUE ONCE
Status: COMPLETED | OUTPATIENT
Start: 2023-04-25 | End: 2023-04-25

## 2023-04-25 RX ADMIN — METHYLPREDNISOLONE ACETATE 40 MG: 40 INJECTION, SUSPENSION INTRA-ARTICULAR; INTRALESIONAL; INTRAMUSCULAR; SOFT TISSUE at 11:13

## 2023-04-25 NOTE — PROGRESS NOTES
Kalpana Marquez presents to [facility] who presents to the clinic for 5-month follow-up with complaints of worsening lower back pain.      History of Present Illness  This is a 52-year-old female who presents to the clinic for 5-month follow-up with complaints of worsening lower back pain.    Narcolepsy: Patient states that she never received the Provigil that was supposedly prescribed by her sleep medicine provider, states that she does still experience pretty extreme fatigue throughout the day, will fall asleep randomly while working or at other places, and states that she knows that she would feel better if able to have this treated.  States that she is unsure if it is an insurance issue, but she did not receive originally.  Has not tried to contact her sleep medicine provider.    Hypertension: Stable.  Blood pressure today is 120/78.  Remains on amlodipine daily, doing really well.  No acute issues.  Denies chest pain or shortness of breath.    She does admit to worsening lower back pain, states that she has had in the past, but at this point it seems like it has been very persistent for the past few months.  Patient states that even just sitting down, position to a certain way, causes her to have pretty extreme back pain.  She states that it is worse on the right, will radiate down into her hip and into her thigh which she was not doing to begin with, and states it is definitely affecting her quality of life.  She has not tried any over-the-counter medications as she does not like to take medication on a routine basis.  She is unsure of what else to do, but it is worsened, did have an MRI done in January 2022 which did show degenerative changes.    The following portions of the patient's history were personally reviewed and updated as appropriate: allergies, current medications, past medical history, past surgical history, past family history, and past social history.  "      Objective   Vital Signs:   /78   Pulse 85   Resp 19   Ht 154.9 cm (60.98\")   Wt 76 kg (167 lb 8 oz)   SpO2 98%   BMI 31.67 kg/m²     Body mass index is 31.67 kg/m².    All labs, imaging, test results, and specialty provider notes reviewed with patient.     Physical Exam  Vitals reviewed.   Constitutional:       Appearance: Normal appearance.   Cardiovascular:      Rate and Rhythm: Normal rate and regular rhythm.      Pulses: Normal pulses.      Heart sounds: Normal heart sounds.   Pulmonary:      Effort: Pulmonary effort is normal.      Breath sounds: Normal breath sounds.   Neurological:      General: No focal deficit present.      Mental Status: She is alert and oriented to person, place, and time.              Assessment and Plan:  Diagnoses and all orders for this visit:    1. DDD (degenerative disc disease), lumbar (Primary)  Comments:  We will send to physical therapy for strengthening and stretching, give steroid injection today.  Can consider pain management if worsens or does not resolve.  Orders:  -     Ambulatory Referral to Physical Therapy Evaluate and treat    2. Low back pain, unspecified back pain laterality, unspecified chronicity, unspecified whether sciatica present  -     methylPREDNISolone acetate (DEPO-medrol) injection 40 mg    3. Primary narcolepsy without cataplexy  Comments:  Patient to call sleep medicine provider to check on Provigil medication, as patient never received.    4. Primary hypertension  Assessment & Plan:  Stable.  Continue on amlodipine at current dose.  Will be due for blood work prior to next visit, so we will go ahead and order.  Continue lifestyle modifications, diet adjustments to include low-salt diet and increased exercise.    Orders:  -     CBC Auto Differential; Future  -     Comprehensive Metabolic Panel; Future  -     TSH Rfx On Abnormal To Free T4; Future  -     Urinalysis With Culture If Indicated -; Future    5. Neuropathy  -     Ambulatory " Referral to Physical Therapy Evaluate and treat    6. Vitamin D deficiency  -     Vitamin D,25-Hydroxy; Future    7. B12 deficiency  -     Vitamin B12 & Folate; Future    8. Screening for lipid disorders  -     Lipid Panel; Future        Follow Up:  Return in about 6 months (around 10/25/2023).    Patient was given instructions and counseling regarding her condition or for health maintenance advice. Please see specific information pulled into the AVS if appropriate.

## 2023-04-25 NOTE — ASSESSMENT & PLAN NOTE
Stable.  Continue on amlodipine at current dose.  Will be due for blood work prior to next visit, so we will go ahead and order.  Continue lifestyle modifications, diet adjustments to include low-salt diet and increased exercise.

## 2023-05-17 ENCOUNTER — HOSPITAL ENCOUNTER (OUTPATIENT)
Dept: MAMMOGRAPHY | Facility: HOSPITAL | Age: 53
Discharge: HOME | End: 2023-05-17
Payer: COMMERCIAL

## 2023-05-17 DIAGNOSIS — Z92.89 HISTORY OF MAMMOGRAPHY, SCREENING: ICD-10-CM

## 2023-05-17 PROCEDURE — 77063 BREAST TOMOSYNTHESIS BI: CPT

## 2023-05-17 PROCEDURE — 77067 SCR MAMMO BI INCL CAD: CPT

## 2023-05-24 DIAGNOSIS — E55.9 VITAMIN D DEFICIENCY: ICD-10-CM

## 2023-05-24 RX ORDER — ERGOCALCIFEROL 1.25 MG/1
CAPSULE ORAL
Qty: 12 CAPSULE | Refills: 1 | Status: SHIPPED | OUTPATIENT
Start: 2023-05-24

## 2023-05-31 ENCOUNTER — CLINICAL SUPPORT (OUTPATIENT)
Dept: FAMILY MEDICINE CLINIC | Facility: CLINIC | Age: 53
End: 2023-05-31

## 2023-05-31 DIAGNOSIS — E53.8 B12 DEFICIENCY: Primary | ICD-10-CM

## 2023-05-31 RX ADMIN — CYANOCOBALAMIN 1000 MCG: 1000 INJECTION, SOLUTION INTRAMUSCULAR; SUBCUTANEOUS at 08:10

## 2023-06-01 DIAGNOSIS — E66.9 OBESITY (BMI 30-39.9): ICD-10-CM

## 2023-06-01 DIAGNOSIS — G47.419 NARCOLEPSY WITHOUT CATAPLEXY: ICD-10-CM

## 2023-06-01 RX ORDER — MODAFINIL 100 MG/1
TABLET ORAL
Qty: 45 TABLET | Refills: 0 | Status: SHIPPED | OUTPATIENT
Start: 2023-06-01

## 2023-06-01 NOTE — TELEPHONE ENCOUNTER
Pt would like to try to get medication, I think I will have to do an appeal to get the generic modafinil bc the name brand is so expensive.

## 2024-05-09 ENCOUNTER — TRANSCRIBE ORDERS (OUTPATIENT)
Dept: ADMINISTRATIVE | Facility: HOSPITAL | Age: 54
End: 2024-05-09
Payer: COMMERCIAL

## 2024-05-09 DIAGNOSIS — Z12.31 SCREENING MAMMOGRAM FOR HIGH-RISK PATIENT: Primary | ICD-10-CM

## 2024-05-30 ENCOUNTER — HOSPITAL ENCOUNTER (OUTPATIENT)
Dept: MAMMOGRAPHY | Facility: HOSPITAL | Age: 54
Discharge: HOME | End: 2024-05-30

## 2024-05-30 DIAGNOSIS — Z12.31 SCREENING MAMMOGRAM FOR HIGH-RISK PATIENT: ICD-10-CM

## 2024-05-30 PROCEDURE — 77067 SCR MAMMO BI INCL CAD: CPT

## 2024-05-30 PROCEDURE — 77063 BREAST TOMOSYNTHESIS BI: CPT

## 2025-04-03 ENCOUNTER — OFFICE VISIT (OUTPATIENT)
Dept: FAMILY MEDICINE CLINIC | Facility: CLINIC | Age: 55
End: 2025-04-03

## 2025-04-03 VITALS
HEART RATE: 61 BPM | TEMPERATURE: 96.2 F | DIASTOLIC BLOOD PRESSURE: 70 MMHG | OXYGEN SATURATION: 96 % | WEIGHT: 164.8 LBS | BODY MASS INDEX: 31.11 KG/M2 | HEIGHT: 61 IN | SYSTOLIC BLOOD PRESSURE: 118 MMHG

## 2025-04-03 DIAGNOSIS — I10 HYPERTENSION, UNSPECIFIED TYPE: ICD-10-CM

## 2025-04-03 DIAGNOSIS — Z12.31 ENCOUNTER FOR SCREENING MAMMOGRAM FOR MALIGNANT NEOPLASM OF BREAST: Primary | ICD-10-CM

## 2025-04-03 RX ORDER — AMLODIPINE BESYLATE 10 MG/1
10 TABLET ORAL DAILY
Qty: 90 TABLET | Refills: 3 | Status: SHIPPED | OUTPATIENT
Start: 2025-04-03

## 2025-04-03 NOTE — PROGRESS NOTES
Answers submitted by the patient for this visit:  Problem not listed (Submitted on 2025)  Chief Complaint: Other medical problem  Reason for appointment: Check up  Chief Complaint  Hypertension (Needs med refill), Dizziness (Need medication), and Fatigue    Nayely Marquez presents to [facility]  History of Present Illness  Was going to the free clinic but they have closed about 6 months ago and is doing well on amlodipine.  Well controlled today at 118/70  does have some swelling time    Check up  Symptoms include: fatigue.       The following portions of the patient's history were personally reviewed and updated as appropriate: allergies, current medications, past medical history, past surgical history, past family history, and past social history.     Body mass index is 31.16 kg/m².          Past History:    Medical History: has a past medical history of Allergic rhinitis, Arthritis, Constipation, Depression, Dizziness, Foot pain, Heel pain, Hypertension, Limb swelling, Numbness in feet, and Seasonal allergies.     Surgical History: has a past surgical history that includes  section; Colonoscopy; Esophagogastroduodenoscopy (, ); Hysterectomy; and Colonoscopy (N/A, 2021).     Family History: family history includes Arthritis in her mother; Cancer in her father; Diabetes in her mother and sister; Stroke in her father.     Social History: reports that she quit smoking about 3 years ago. Her smoking use included cigarettes. She started smoking about 28 years ago. She has a 12.5 pack-year smoking history. She has never used smokeless tobacco. She reports that she does not drink alcohol and does not use drugs.    Allergies: Latex          Current Outpatient Medications:     amLODIPine (NORVASC) 10 MG tablet, Take 1 tablet by mouth Daily., Disp: 90 tablet, Rfl: 3  No current facility-administered medications for this visit.    Medications  "Discontinued During This Encounter   Medication Reason    cyanocobalamin injection 1,000 mcg     loratadine (CLARITIN) 10 MG tablet *Therapy completed    meclizine (ANTIVERT) 12.5 MG tablet *Therapy completed    Eye Itch Relief 0.025 % ophthalmic solution *Therapy completed    montelukast (SINGULAIR) 10 MG tablet *Therapy completed    vitamin D (ERGOCALCIFEROL) 1.25 MG (92678 UT) capsule capsule *Therapy completed    modafinil (PROVIGIL) 100 MG tablet *Therapy completed    olopatadine (PATADAY) 0.2 % solution ophthalmic solution *Therapy completed    cyclobenzaprine (FLEXERIL) 5 MG tablet *Therapy completed    ipratropium (ATROVENT) 0.03 % nasal spray *Therapy completed    amLODIPine (NORVASC) 10 MG tablet Reorder         Review of Systems   Constitutional:  Positive for fatigue.   Neurological:  Positive for dizziness.        Objective         Vitals:    04/03/25 0907   BP: 118/70   BP Location: Right arm   Patient Position: Sitting   Cuff Size: Adult   Pulse: 61   Temp: 96.2 °F (35.7 °C)   TempSrc: Temporal   SpO2: 96%   Weight: 74.8 kg (164 lb 12.8 oz)   Height: 154.9 cm (60.98\")     Body mass index is 31.16 kg/m².         Physical Exam  Vitals reviewed.   Constitutional:       Appearance: Normal appearance. She is well-developed.   HENT:      Head: Normocephalic and atraumatic.      Mouth/Throat:      Pharynx: No oropharyngeal exudate.   Eyes:      Conjunctiva/sclera: Conjunctivae normal.      Pupils: Pupils are equal, round, and reactive to light.   Cardiovascular:      Rate and Rhythm: Normal rate and regular rhythm.      Heart sounds: Normal heart sounds. No murmur heard.     No friction rub. No gallop.   Pulmonary:      Effort: Pulmonary effort is normal.      Breath sounds: Normal breath sounds. No wheezing or rhonchi.   Skin:     General: Skin is warm and dry.   Neurological:      Mental Status: She is alert and oriented to person, place, and time.   Psychiatric:         Mood and Affect: Mood and affect " normal.         Behavior: Behavior normal.         Thought Content: Thought content normal.         Judgment: Judgment normal.             Result Review :               Assessment and Plan     Diagnoses and all orders for this visit:    1. Hypertension, unspecified type  Comments:  Stable. Continue on amlodipine  Orders:  -     amLODIPine (NORVASC) 10 MG tablet; Take 1 tablet by mouth Daily.  Dispense: 90 tablet; Refill: 3  -     Lipid Panel With / Chol / HDL Ratio; Future  -     Comprehensive Metabolic Panel; Future  -     CBC (No Diff); Future              Follow Up     Return in about 1 year (around 4/3/2026).    Patient was given instructions and counseling regarding her condition or for health maintenance advice. Please see specific information pulled into the AVS if appropriate.

## (undated) DEVICE — Device: Brand: DEFENDO AIR/WATER/SUCTION AND BIOPSY VALVE

## (undated) DEVICE — SOL IRRG H2O PL/BG 1000ML STRL

## (undated) DEVICE — COLON KIT: Brand: MEDLINE INDUSTRIES, INC.